# Patient Record
Sex: FEMALE | Race: BLACK OR AFRICAN AMERICAN | Employment: OTHER | URBAN - METROPOLITAN AREA
[De-identification: names, ages, dates, MRNs, and addresses within clinical notes are randomized per-mention and may not be internally consistent; named-entity substitution may affect disease eponyms.]

---

## 2023-03-02 PROBLEM — K21.9 GERD (GASTROESOPHAGEAL REFLUX DISEASE): Status: ACTIVE | Noted: 2023-03-02

## 2023-03-02 PROBLEM — M85.851 OSTEOPENIA OF BOTH HIPS: Status: ACTIVE | Noted: 2023-03-02

## 2023-03-02 PROBLEM — F41.8 DEPRESSION WITH ANXIETY: Status: ACTIVE | Noted: 2023-03-02

## 2023-03-02 PROBLEM — M85.852 OSTEOPENIA OF BOTH HIPS: Status: ACTIVE | Noted: 2023-03-02

## 2023-03-02 PROBLEM — N28.9 RENAL INSUFFICIENCY: Status: ACTIVE | Noted: 2023-03-02

## 2023-03-02 PROBLEM — I10 HYPERTENSION, BENIGN: Status: ACTIVE | Noted: 2023-03-02

## 2023-03-02 PROBLEM — F10.11 HISTORY OF ALCOHOL ABUSE: Status: ACTIVE | Noted: 2023-03-02

## 2023-03-02 PROBLEM — R82.81 PYURIA: Status: ACTIVE | Noted: 2023-03-02

## 2023-03-02 PROBLEM — Z86.19 HISTORY OF HEPATITIS C VIRUS INFECTION: Status: ACTIVE | Noted: 2023-03-02

## 2023-03-02 PROBLEM — D64.9 MILD ANEMIA: Status: ACTIVE | Noted: 2023-03-02

## 2023-03-02 PROBLEM — L65.9 ALOPECIA OF SCALP: Status: ACTIVE | Noted: 2023-03-02

## 2023-03-02 RX ORDER — ARIPIPRAZOLE 10 MG/1
1 TABLET ORAL DAILY
COMMUNITY
Start: 2022-08-24 | End: 2023-09-06 | Stop reason: SDUPTHER

## 2023-03-02 RX ORDER — LISINOPRIL 10 MG/1
1 TABLET ORAL DAILY
COMMUNITY
Start: 2022-08-24 | End: 2023-09-06 | Stop reason: SDUPTHER

## 2023-03-02 RX ORDER — HYDROCHLOROTHIAZIDE 25 MG/1
1 TABLET ORAL DAILY
COMMUNITY
Start: 2022-08-24 | End: 2023-03-20

## 2023-03-02 RX ORDER — IBANDRONATE SODIUM 150 MG/1
1 TABLET, FILM COATED ORAL
COMMUNITY
Start: 2022-09-21 | End: 2023-04-18

## 2023-03-02 RX ORDER — OMEPRAZOLE 40 MG/1
1 CAPSULE, DELAYED RELEASE ORAL DAILY
COMMUNITY
Start: 2022-08-24 | End: 2023-09-06 | Stop reason: SDUPTHER

## 2023-03-02 RX ORDER — HALOPERIDOL 5 MG/1
1 TABLET ORAL 2 TIMES DAILY
COMMUNITY
Start: 2022-08-24 | End: 2023-03-09 | Stop reason: SDUPTHER

## 2023-03-02 RX ORDER — AMLODIPINE BESYLATE 2.5 MG/1
1 TABLET ORAL DAILY
COMMUNITY
Start: 2022-08-24 | End: 2023-09-06 | Stop reason: SDUPTHER

## 2023-03-02 RX ORDER — DULOXETIN HYDROCHLORIDE 60 MG/1
1 CAPSULE, DELAYED RELEASE ORAL DAILY
COMMUNITY
Start: 2022-08-24 | End: 2023-09-06 | Stop reason: SDUPTHER

## 2023-03-09 ENCOUNTER — TELEPHONE (OUTPATIENT)
Dept: PRIMARY CARE | Facility: CLINIC | Age: 68
End: 2023-03-09
Payer: MEDICARE

## 2023-03-09 DIAGNOSIS — F41.8 DEPRESSION WITH ANXIETY: ICD-10-CM

## 2023-03-09 RX ORDER — HALOPERIDOL 5 MG/1
5 TABLET ORAL 2 TIMES DAILY
Qty: 180 TABLET | Refills: 2 | Status: SHIPPED | OUTPATIENT
Start: 2023-03-09 | End: 2023-09-06 | Stop reason: SDUPTHER

## 2023-03-20 DIAGNOSIS — I10 ESSENTIAL (PRIMARY) HYPERTENSION: Primary | ICD-10-CM

## 2023-03-20 RX ORDER — HYDROCHLOROTHIAZIDE 25 MG/1
TABLET ORAL
Qty: 30 TABLET | Refills: 5 | Status: SHIPPED | OUTPATIENT
Start: 2023-03-20 | End: 2023-09-06 | Stop reason: SDUPTHER

## 2023-04-18 DIAGNOSIS — M85.852 OTHER SPECIFIED DISORDERS OF BONE DENSITY AND STRUCTURE, LEFT THIGH: ICD-10-CM

## 2023-04-18 DIAGNOSIS — M85.851 OTHER SPECIFIED DISORDERS OF BONE DENSITY AND STRUCTURE, RIGHT THIGH: ICD-10-CM

## 2023-04-18 RX ORDER — IBANDRONATE SODIUM 150 MG/1
TABLET, FILM COATED ORAL
Qty: 1 TABLET | Refills: 5 | Status: SHIPPED | OUTPATIENT
Start: 2023-04-18 | End: 2023-10-02 | Stop reason: SDUPTHER

## 2023-04-18 NOTE — TELEPHONE ENCOUNTER
Rx Refill Request     Name: Shivani Neil  :  1955     Date of last appointment:  2022  Date of next appointment:  2023   Best number to reach patient:  [phone]

## 2023-09-06 ENCOUNTER — TELEPHONE (OUTPATIENT)
Dept: PRIMARY CARE | Facility: CLINIC | Age: 68
End: 2023-09-06
Payer: MEDICARE

## 2023-09-06 DIAGNOSIS — F41.8 DEPRESSION WITH ANXIETY: ICD-10-CM

## 2023-09-06 DIAGNOSIS — K21.9 GASTROESOPHAGEAL REFLUX DISEASE, UNSPECIFIED WHETHER ESOPHAGITIS PRESENT: ICD-10-CM

## 2023-09-06 DIAGNOSIS — I10 ESSENTIAL (PRIMARY) HYPERTENSION: ICD-10-CM

## 2023-09-06 DIAGNOSIS — I10 HYPERTENSION, BENIGN: ICD-10-CM

## 2023-09-06 RX ORDER — ARIPIPRAZOLE 10 MG/1
10 TABLET ORAL DAILY
Qty: 90 TABLET | Refills: 3 | Status: SHIPPED | OUTPATIENT
Start: 2023-09-06

## 2023-09-06 RX ORDER — OMEPRAZOLE 40 MG/1
40 CAPSULE, DELAYED RELEASE ORAL DAILY
Qty: 90 CAPSULE | Refills: 2 | Status: SHIPPED | OUTPATIENT
Start: 2023-09-06 | End: 2024-05-29

## 2023-09-06 RX ORDER — HYDROCHLOROTHIAZIDE 25 MG/1
25 TABLET ORAL DAILY
Qty: 90 TABLET | Refills: 3 | Status: SHIPPED | OUTPATIENT
Start: 2023-09-06

## 2023-09-06 RX ORDER — LISINOPRIL 10 MG/1
10 TABLET ORAL DAILY
Qty: 90 TABLET | Refills: 3 | Status: SHIPPED | OUTPATIENT
Start: 2023-09-06

## 2023-09-06 RX ORDER — AMLODIPINE BESYLATE 2.5 MG/1
2.5 TABLET ORAL DAILY
Qty: 90 TABLET | Refills: 3 | Status: SHIPPED | OUTPATIENT
Start: 2023-09-06 | End: 2023-12-12 | Stop reason: ALTCHOICE

## 2023-09-06 RX ORDER — HALOPERIDOL 5 MG/1
5 TABLET ORAL 2 TIMES DAILY
Qty: 180 TABLET | Refills: 2 | Status: SHIPPED | OUTPATIENT
Start: 2023-09-06 | End: 2023-11-13

## 2023-09-06 RX ORDER — DULOXETIN HYDROCHLORIDE 60 MG/1
60 CAPSULE, DELAYED RELEASE ORAL DAILY
Qty: 90 CAPSULE | Refills: 3 | Status: SHIPPED | OUTPATIENT
Start: 2023-09-06

## 2023-09-06 NOTE — TELEPHONE ENCOUNTER
Medication has been pended to provider for approval.     LV: Visit date not found   NV:  10/2/2023

## 2023-10-02 ENCOUNTER — OFFICE VISIT (OUTPATIENT)
Dept: PRIMARY CARE | Facility: CLINIC | Age: 68
End: 2023-10-02
Payer: MEDICARE

## 2023-10-02 VITALS
SYSTOLIC BLOOD PRESSURE: 114 MMHG | OXYGEN SATURATION: 99 % | WEIGHT: 151.6 LBS | BODY MASS INDEX: 29.76 KG/M2 | DIASTOLIC BLOOD PRESSURE: 66 MMHG | HEART RATE: 91 BPM | TEMPERATURE: 97.4 F | HEIGHT: 60 IN | RESPIRATION RATE: 16 BRPM

## 2023-10-02 DIAGNOSIS — Z23 NEED FOR INFLUENZA VACCINATION: ICD-10-CM

## 2023-10-02 DIAGNOSIS — F25.0 SCHIZOAFFECTIVE DISORDER, BIPOLAR TYPE (MULTI): ICD-10-CM

## 2023-10-02 DIAGNOSIS — Z12.11 SCREENING FOR MALIGNANT NEOPLASM OF COLON: ICD-10-CM

## 2023-10-02 DIAGNOSIS — Z00.00 ROUTINE GENERAL MEDICAL EXAMINATION AT HEALTH CARE FACILITY: Primary | ICD-10-CM

## 2023-10-02 DIAGNOSIS — K21.9 GASTROESOPHAGEAL REFLUX DISEASE WITHOUT ESOPHAGITIS: ICD-10-CM

## 2023-10-02 DIAGNOSIS — Z12.31 ENCOUNTER FOR SCREENING MAMMOGRAM FOR MALIGNANT NEOPLASM OF BREAST: ICD-10-CM

## 2023-10-02 DIAGNOSIS — I10 HYPERTENSION, BENIGN: ICD-10-CM

## 2023-10-02 DIAGNOSIS — D64.9 MILD ANEMIA: ICD-10-CM

## 2023-10-02 DIAGNOSIS — M85.852 OTHER SPECIFIED DISORDERS OF BONE DENSITY AND STRUCTURE, LEFT THIGH: ICD-10-CM

## 2023-10-02 DIAGNOSIS — M85.851 OTHER SPECIFIED DISORDERS OF BONE DENSITY AND STRUCTURE, RIGHT THIGH: ICD-10-CM

## 2023-10-02 DIAGNOSIS — Q61.3 POLYCYSTIC KIDNEY DISEASE: ICD-10-CM

## 2023-10-02 PROCEDURE — 1170F FXNL STATUS ASSESSED: CPT | Performed by: FAMILY MEDICINE

## 2023-10-02 PROCEDURE — 1159F MED LIST DOCD IN RCRD: CPT | Performed by: FAMILY MEDICINE

## 2023-10-02 PROCEDURE — G0439 PPPS, SUBSEQ VISIT: HCPCS | Performed by: FAMILY MEDICINE

## 2023-10-02 PROCEDURE — 99214 OFFICE O/P EST MOD 30 MIN: CPT | Performed by: FAMILY MEDICINE

## 2023-10-02 PROCEDURE — 90662 IIV NO PRSV INCREASED AG IM: CPT | Performed by: FAMILY MEDICINE

## 2023-10-02 PROCEDURE — 3078F DIAST BP <80 MM HG: CPT | Performed by: FAMILY MEDICINE

## 2023-10-02 PROCEDURE — G0008 ADMIN INFLUENZA VIRUS VAC: HCPCS | Performed by: FAMILY MEDICINE

## 2023-10-02 PROCEDURE — 1160F RVW MEDS BY RX/DR IN RCRD: CPT | Performed by: FAMILY MEDICINE

## 2023-10-02 PROCEDURE — 3074F SYST BP LT 130 MM HG: CPT | Performed by: FAMILY MEDICINE

## 2023-10-02 RX ORDER — IBANDRONATE SODIUM 150 MG/1
150 TABLET, FILM COATED ORAL
Qty: 1 TABLET | Refills: 5 | Status: SHIPPED | OUTPATIENT
Start: 2023-10-02 | End: 2024-03-30

## 2023-10-02 RX ORDER — DEUTETRABENAZINE 9 MG/1
9 TABLET, COATED ORAL 2 TIMES DAILY
COMMUNITY
Start: 2023-08-09

## 2023-10-02 ASSESSMENT — PATIENT HEALTH QUESTIONNAIRE - PHQ9
10. IF YOU CHECKED OFF ANY PROBLEMS, HOW DIFFICULT HAVE THESE PROBLEMS MADE IT FOR YOU TO DO YOUR WORK, TAKE CARE OF THINGS AT HOME, OR GET ALONG WITH OTHER PEOPLE: VERY DIFFICULT
5. POOR APPETITE OR OVEREATING: MORE THAN HALF THE DAYS
3. TROUBLE FALLING OR STAYING ASLEEP: NOT AT ALL
7. TROUBLE CONCENTRATING ON THINGS, SUCH AS READING THE NEWSPAPER OR WATCHING TELEVISION: NEARLY EVERY DAY
2. FEELING DOWN, DEPRESSED OR HOPELESS: NEARLY EVERY DAY
SUM OF ALL RESPONSES TO PHQ9 QUESTIONS 1 & 2: 6
6. FEELING BAD ABOUT YOURSELF - OR THAT YOU ARE A FAILURE OR HAVE LET YOURSELF OR YOUR FAMILY DOWN: NEARLY EVERY DAY
9. THOUGHTS THAT YOU WOULD BE BETTER OFF DEAD, OR OF HURTING YOURSELF: NOT AT ALL
4. FEELING TIRED OR HAVING LITTLE ENERGY: NOT AT ALL
1. LITTLE INTEREST OR PLEASURE IN DOING THINGS: NEARLY EVERY DAY
8. MOVING OR SPEAKING SO SLOWLY THAT OTHER PEOPLE COULD HAVE NOTICED. OR THE OPPOSITE, BEING SO FIGETY OR RESTLESS THAT YOU HAVE BEEN MOVING AROUND A LOT MORE THAN USUAL: NOT AT ALL
SUM OF ALL RESPONSES TO PHQ QUESTIONS 1-9: 14

## 2023-10-02 ASSESSMENT — ACTIVITIES OF DAILY LIVING (ADL)
DRESSING: INDEPENDENT
TAKING_MEDICATION: INDEPENDENT
DOING_HOUSEWORK: INDEPENDENT
MANAGING_FINANCES: INDEPENDENT
BATHING: INDEPENDENT
GROCERY_SHOPPING: INDEPENDENT

## 2023-10-02 ASSESSMENT — ENCOUNTER SYMPTOMS
DEPRESSION: 0
OCCASIONAL FEELINGS OF UNSTEADINESS: 0
LOSS OF SENSATION IN FEET: 0

## 2023-10-02 NOTE — PROGRESS NOTES
Chief Complaint   Patient presents with    Medicare Annual Wellness Visit Subsequent    Follow-up     Hypertension, osteopenia, anemia and other chronic medical conditions.       HPI: Shivani Neil is a 68 y.o. female presenting for follow-up Hypertension, osteopenia, anemia and other chronic medical conditions.     Patient requests a refill of the medication, Austedo 9 mg that she was placed on for her shaking hands. She states that Dr. Milligan placed her on this medication? She states that she has a phone call with him tomorrow and I advised patient that she may neef to contact him for that refill. The medication does seem to be helping her tremors.     Patient's sister had a kidney transplant 1 year ago.     ROS    Except positives as noted in the CC & HPI   Constitutional: Denies fevers, chills, night sweats, fatigue, weight changes, change in appetite   Eyes: Denies blurry vision, double vision   ENT: Denies otalgia, trouble hearing, tinnitus, vertigo, nasal congestion, rhinorrhea, sore throat   Neck: Denies swelling, masses   Cardiovascular: Denies chest pain, palpitations, edema, orthopnea, syncope   Respiratory: Denies dyspnea, cough, wheezing, postural nocturnal dyspnea   Gastrointestinal: Denies abdominal pain, nausea, vomiting, diarrhea, constipation, melena, hematochezia   Genitourinary: Denies dysuria, hematuria, frequency, urgency   Musculoskeletal: Denies back pain, neck pain, arthralgias, myalgias   Integumentary: Denies skin lesions, rashes, masses   Neurological: Denies dizziness, headaches, confusion, limb weakness, paresthesias, syncope, convulsions   Psychiatric: Denies depression, anxiety, homicidal ideations, suicidal ideations, sleep disturbances   Endocrine: Denies polyphagia, polydipsia, polyuria, weakness, hair thinning, heat intolerance, cold intolerance, weight changes   Heme/Lymph: Denies easy bruising, easy bleeding, swollen glands     Vitals:    10/02/23 1603 10/02/23 1629   BP:  "131/82 114/66   BP Location:  Right arm   Patient Position:  Sitting   BP Cuff Size:  Adult   Pulse: 91    Resp: 16    Temp: 36.3 °C (97.4 °F)    SpO2: 99%    Weight: 68.8 kg (151 lb 9.6 oz)    Height: 1.52 m (4' 11.85\")        PHYSICAL EXAM    GENERAL APPEARANCE: well-developed, well-nourished, 68 y.o. female in no acute distress.  SKIN: warm, pink and dry without rash or concerning lesions.  MENTAL STATUS: alert and oriented × 3. Normal mood and affect appropriate to mood.  EARS: TMs shiny and move well with insufflation. Ear canals are clear bilaterally.  NECK: supple without lymphadenopathy. Carotid pulses are normal without bruits. Thyroid - is normal in midline without nodules.  CHEST: lungs are clear to auscultation without rales, rhonchi or wheezes.  HEART: regular, rate and rhythm without murmurs, rubs or gallops.  ABDOMEN: soft, flat, nondistended. No masses, hepatomegaly or splenomegaly is noted.  EXTREMITIES: no cyanosis, clubbing or edema. Pedal pulses are 2+ normal at the dorsalis pedis and posterior tibial pulses bilaterally.  NEUROLOGICAL: cranial nerves II through XII are grossly intact. Motor strength 5/5 at all fours. DTRs are 2+ normal at all fours bilaterally and symmetrically.      Below is the patient's most recent value for Albumin, ALT, AST, BUN, Calcium, Chloride, Cholesterol, CO2, Creatinine, GFR, Glucose, HDL, Hematocrit, Hemoglobin, Hemoglobin A1C, LDL, Magnesium, Phosphorus, Platelets, Potassium, PSA, Sodium, Triglycerides, and WBC.   Lab Results   Component Value Date    ALBUMIN 3.9 09/14/2022    ALT 20 09/14/2022    AST 30 09/14/2022    BUN 28 (H) 09/14/2022    CALCIUM 9.9 09/14/2022     09/14/2022    CHOL 233 (H) 09/14/2022    CO2 28 09/14/2022    CREATININE 1.26 (H) 09/14/2022    HDL 69.0 09/14/2022    HCT 36.8 09/14/2022    HGB 11.9 (L) 09/14/2022     09/14/2022    K 4.3 09/14/2022     09/14/2022    TRIG 83 09/14/2022    WBC 6.3 09/14/2022         ASSESSMENT:  1. " Routine general medical examination at health care facility  1 Year Follow Up In [facility] - PCP - Wellness Exam      2. Polycystic kidney disease        3. Other specified disorders of bone density and structure, right thigh  ibandronate (Boniva) 150 mg tablet      4. Other specified disorders of bone density and structure, left thigh  ibandronate (Boniva) 150 mg tablet      5. Hypertension, benign  Comprehensive Metabolic Panel    Lipid Panel      6. Gastroesophageal reflux disease without esophagitis        7. Mild anemia  CBC and Auto Differential      8. Schizoaffective disorder, bipolar type (CMS/HCC)        9. Screening for malignant neoplasm of colon  Cologuard® colon cancer screening    Cologuard® colon cancer screening      10. Encounter for screening mammogram for malignant neoplasm of breast  BI mammo bilateral screening tomosynthesis      11. Need for influenza vaccination  Flu vaccine, quadrivalent, high-dose, preservative free, age 65y+ (FLUZONE)          PLAN:  Patient will continue current diet, exercise plan, and medications.     Orders for  CMP, CBC, and LIPID was placed to be done fasting soon. We will call the patient with the results.      Patient was ordered a Bilateral mammogram for screening.   Patient was ordered a Cologuard for screening.     Patient was given a refill of her Boniva. Rx sent to [facility].     Patient will consult he sister in regards to reason for transplant and relay thay  information to us.     Patient was given a flu vaccine in the office today.     She will think about getting the singles vaccine a the pharmacy.     I have instructed the patient to follow-up with me in 6 months or sooner if needed.             Subjective   Reason for Visit: Shivani Neil is an 68 y.o. female here for a Medicare Wellness visit.          Reviewed all medications by prescribing practitioner or clinical pharmacist (such as prescriptions, OTCs, herbal therapies and supplements) and  "documented in the medical record.    HPI    Patient Care Team:  Cooper Woodson MD as PCP - General  Cooper Woodson MD as PCP - [employer] PCP     Review of Systems    Objective   Vitals:  /66 (BP Location: Right arm, Patient Position: Sitting, BP Cuff Size: Adult)   Pulse 91   Temp 36.3 °C (97.4 °F)   Resp 16   Ht 1.52 m (4' 11.85\")   Wt 68.8 kg (151 lb 9.6 oz)   SpO2 99%   BMI 29.76 kg/m²       Physical Exam    Assessment/Plan   Problem List Items Addressed This Visit       GERD (gastroesophageal reflux disease)    Hypertension, benign    Relevant Orders    Comprehensive Metabolic Panel    Lipid Panel    Mild anemia    Relevant Orders    CBC and Auto Differential    Schizoaffective disorder, bipolar type (CMS/HCC)     Other Visit Diagnoses       Routine general medical examination at health care facility    -  Primary    Relevant Orders    1 Year Follow Up In [facility] - PCP - Wellness Exam    Polycystic kidney disease        Other specified disorders of bone density and structure, right thigh        Relevant Medications    ibandronate (Boniva) 150 mg tablet    Other specified disorders of bone density and structure, left thigh        Relevant Medications    ibandronate (Boniva) 150 mg tablet    Screening for malignant neoplasm of colon        Relevant Orders    Cologuard® colon cancer screening    Encounter for screening mammogram for malignant neoplasm of breast        Relevant Orders    BI mammo bilateral screening tomosynthesis    Need for influenza vaccination        Relevant Orders    Flu vaccine, quadrivalent, high-dose, preservative free, age 65y+ (FLUZONE) (Completed)               "

## 2023-10-04 ENCOUNTER — APPOINTMENT (OUTPATIENT)
Dept: RADIOLOGY | Facility: HOSPITAL | Age: 68
End: 2023-10-04
Payer: MEDICARE

## 2023-10-05 ENCOUNTER — TELEPHONE (OUTPATIENT)
Dept: PRIMARY CARE | Facility: CLINIC | Age: 68
End: 2023-10-05
Payer: MEDICARE

## 2023-10-05 NOTE — TELEPHONE ENCOUNTER
Patient received a cologuard kit in the mail and needed assistance with how to complete it. She is aware that she will have to have [employer] pick it up with the included postage sticker and just call them or do it online. If she has issues with contacting [employer] she can call back ans we can schedule the  with the tracking number via the cologuard website.

## 2023-10-05 NOTE — TELEPHONE ENCOUNTER
Patient called stating that she just received medication through the mail and she doesn't know what it is or what it is for   Patient would like a call back   Please advise

## 2023-10-24 ENCOUNTER — APPOINTMENT (OUTPATIENT)
Dept: PRIMARY CARE | Facility: CLINIC | Age: 68
End: 2023-10-24
Payer: MEDICARE

## 2023-10-24 ENCOUNTER — HOSPITAL ENCOUNTER (OUTPATIENT)
Dept: RADIOLOGY | Facility: HOSPITAL | Age: 68
Discharge: HOME | End: 2023-10-24
Payer: MEDICARE

## 2023-10-24 DIAGNOSIS — Z12.31 ENCOUNTER FOR SCREENING MAMMOGRAM FOR MALIGNANT NEOPLASM OF BREAST: ICD-10-CM

## 2023-10-24 PROCEDURE — 77067 SCR MAMMO BI INCL CAD: CPT | Mod: 50

## 2023-10-24 PROCEDURE — 77067 SCR MAMMO BI INCL CAD: CPT | Mod: BILATERAL PROCEDURE | Performed by: RADIOLOGY

## 2023-10-24 PROCEDURE — 77063 BREAST TOMOSYNTHESIS BI: CPT | Mod: BILATERAL PROCEDURE | Performed by: RADIOLOGY

## 2023-10-26 LAB — NONINV COLON CA DNA+OCC BLD SCRN STL QL: NEGATIVE

## 2023-11-01 ENCOUNTER — APPOINTMENT (OUTPATIENT)
Dept: PRIMARY CARE | Facility: CLINIC | Age: 68
End: 2023-11-01
Payer: MEDICARE

## 2023-11-13 ENCOUNTER — TELEPHONE (OUTPATIENT)
Dept: PRIMARY CARE | Facility: CLINIC | Age: 68
End: 2023-11-13
Payer: MEDICARE

## 2023-11-13 RX ORDER — HALOPERIDOL 10 MG/1
10 TABLET ORAL
COMMUNITY
Start: 2023-11-10

## 2023-11-13 RX ORDER — NAPROXEN 500 MG/1
500 TABLET ORAL 2 TIMES DAILY
COMMUNITY
Start: 2023-05-29

## 2023-11-13 RX ORDER — NALOXONE HYDROCHLORIDE 4 MG/.1ML
SPRAY NASAL
COMMUNITY
Start: 2023-05-29

## 2023-11-13 NOTE — TELEPHONE ENCOUNTER
Patient is requesting her medication hydroCHLOROthiazide (HYDRODiuril) 25 mg tablet to be increased. Shivani is having right swelling in her foot and can barley walk on her foot.

## 2023-11-13 NOTE — TELEPHONE ENCOUNTER
Medication:  hydroCHLOROthiazide (HYDRODiuril) 25 mg tablet   Last fill: 09.07.2023 90 day supply   LOV: 11.22.2023   NOV: 10.02.2023     Please advise

## 2023-11-22 ENCOUNTER — APPOINTMENT (OUTPATIENT)
Dept: PRIMARY CARE | Facility: CLINIC | Age: 68
End: 2023-11-22
Payer: MEDICARE

## 2023-12-12 ENCOUNTER — OFFICE VISIT (OUTPATIENT)
Dept: PRIMARY CARE | Facility: CLINIC | Age: 68
End: 2023-12-12
Payer: MEDICARE

## 2023-12-12 VITALS
TEMPERATURE: 96.8 F | HEART RATE: 79 BPM | DIASTOLIC BLOOD PRESSURE: 58 MMHG | SYSTOLIC BLOOD PRESSURE: 90 MMHG | RESPIRATION RATE: 16 BRPM | OXYGEN SATURATION: 99 % | HEIGHT: 60 IN | WEIGHT: 151.2 LBS | BODY MASS INDEX: 29.68 KG/M2

## 2023-12-12 DIAGNOSIS — E66.3 OVERWEIGHT WITH BODY MASS INDEX (BMI) OF 29 TO 29.9 IN ADULT: ICD-10-CM

## 2023-12-12 DIAGNOSIS — F25.0 SCHIZOAFFECTIVE DISORDER, BIPOLAR TYPE (MULTI): ICD-10-CM

## 2023-12-12 DIAGNOSIS — K21.9 GASTROESOPHAGEAL REFLUX DISEASE WITHOUT ESOPHAGITIS: ICD-10-CM

## 2023-12-12 DIAGNOSIS — I10 HYPERTENSION, BENIGN: ICD-10-CM

## 2023-12-12 DIAGNOSIS — F41.8 DEPRESSION WITH ANXIETY: ICD-10-CM

## 2023-12-12 DIAGNOSIS — N28.9 RENAL INSUFFICIENCY: ICD-10-CM

## 2023-12-12 DIAGNOSIS — R55 HYPOTENSIVE SYNCOPE: Primary | ICD-10-CM

## 2023-12-12 PROCEDURE — 1036F TOBACCO NON-USER: CPT | Performed by: FAMILY MEDICINE

## 2023-12-12 PROCEDURE — 1159F MED LIST DOCD IN RCRD: CPT | Performed by: FAMILY MEDICINE

## 2023-12-12 PROCEDURE — 3008F BODY MASS INDEX DOCD: CPT | Performed by: FAMILY MEDICINE

## 2023-12-12 PROCEDURE — 3074F SYST BP LT 130 MM HG: CPT | Performed by: FAMILY MEDICINE

## 2023-12-12 PROCEDURE — 1160F RVW MEDS BY RX/DR IN RCRD: CPT | Performed by: FAMILY MEDICINE

## 2023-12-12 PROCEDURE — 99214 OFFICE O/P EST MOD 30 MIN: CPT | Performed by: FAMILY MEDICINE

## 2023-12-12 PROCEDURE — 3078F DIAST BP <80 MM HG: CPT | Performed by: FAMILY MEDICINE

## 2023-12-12 ASSESSMENT — PATIENT HEALTH QUESTIONNAIRE - PHQ9
8. MOVING OR SPEAKING SO SLOWLY THAT OTHER PEOPLE COULD HAVE NOTICED. OR THE OPPOSITE, BEING SO FIGETY OR RESTLESS THAT YOU HAVE BEEN MOVING AROUND A LOT MORE THAN USUAL: SEVERAL DAYS
6. FEELING BAD ABOUT YOURSELF - OR THAT YOU ARE A FAILURE OR HAVE LET YOURSELF OR YOUR FAMILY DOWN: SEVERAL DAYS
SUM OF ALL RESPONSES TO PHQ QUESTIONS 1-9: 14
9. THOUGHTS THAT YOU WOULD BE BETTER OFF DEAD, OR OF HURTING YOURSELF: NOT AT ALL
1. LITTLE INTEREST OR PLEASURE IN DOING THINGS: NEARLY EVERY DAY
4. FEELING TIRED OR HAVING LITTLE ENERGY: MORE THAN HALF THE DAYS
7. TROUBLE CONCENTRATING ON THINGS, SUCH AS READING THE NEWSPAPER OR WATCHING TELEVISION: SEVERAL DAYS
3. TROUBLE FALLING OR STAYING ASLEEP: NEARLY EVERY DAY
10. IF YOU CHECKED OFF ANY PROBLEMS, HOW DIFFICULT HAVE THESE PROBLEMS MADE IT FOR YOU TO DO YOUR WORK, TAKE CARE OF THINGS AT HOME, OR GET ALONG WITH OTHER PEOPLE: SOMEWHAT DIFFICULT
2. FEELING DOWN, DEPRESSED OR HOPELESS: NEARLY EVERY DAY
SUM OF ALL RESPONSES TO PHQ9 QUESTIONS 1 & 2: 6
5. POOR APPETITE OR OVEREATING: NOT AT ALL

## 2023-12-12 ASSESSMENT — ENCOUNTER SYMPTOMS
DEPRESSION: 0
OCCASIONAL FEELINGS OF UNSTEADINESS: 0
LOSS OF SENSATION IN FEET: 0

## 2023-12-12 NOTE — ASSESSMENT & PLAN NOTE
Condition is stable. Patient is to continue current medications and regimen. Patient is to follow up with psych to monitor his/her condition at least once annually.

## 2023-12-12 NOTE — PROGRESS NOTES
Subjective   Patient ID: Shivani Neil is a 68 y.o. female who presents for Hypertension.  I last saw the patient on 10/2/2023.     HPI   Patient states that she has been blacking out. She states that the last time this happened she broke her arm in two places in May. She states that this has been happening for 6 months now. She does not remember what she was doing. Her granddaughter and daughter took her to the ER.     Patient states that her right foot is repeatedly swelling up.     She states that she has been walking and cutting back on calories and still cannot lose any weight. She is wondering if it could be her medication.      Review of Systems  Except positives as noted in the CC & HPI      Constitutional: Denies fevers, chills, night sweats, fatigue, weight changes, change in appetite    Eyes: Denies blurry vision, double vision    ENT: Denies otalgia, trouble hearing, tinnitus, vertigo, nasal congestion, rhinorrhea, sore throat    Neck: Denies swelling, masses    Cardiovascular: Denies chest pain, palpitations, edema, orthopnea, syncope    Respiratory: Denies dyspnea, cough, wheezing, postural nocturnal dyspnea    Gastrointestinal: Denies abdominal pain, nausea, vomiting, diarrhea, constipation, melena, hematochezia    Genitourinary: Denies dysuria, hematuria, frequency, urgency    Musculoskeletal: Denies back pain, neck pain, myalgias    Integumentary: Denies skin lesions, rashes, masses    Neurological: Denies dizziness, headaches, confusion, limb weakness, paresthesias, convulsions    Psychiatric: Denies depression, anxiety, homicidal ideations, suicidal ideations, sleep disturbances    Endocrine: Denies polyphagia, polydipsia, polyuria, weakness, hair thinning, heat intolerance, cold intolerance, weight changes    Heme/Lymph: Denies easy bruising, easy bleeding    Objective   BP 90/58 (BP Location: Right arm, Patient Position: Sitting)   Pulse 79   Temp 36 °C (96.8 °F)   Resp 16   Ht 1.523 m  "(4' 11.95\")   Wt 68.6 kg (151 lb 3.2 oz)   SpO2 99%   BMI 29.58 kg/m²     Physical Exam  90/58 on recheck of BP in the right arm.     Gen. Appearance - well-developed, well-nourished, 68 y.o., Black female in no acute distress.     Skin - warm, pink and dry without rash or concerning lesions.     Mental Status - alert and oriented times 3. Normal mood and affect appropriate to mood.     Neck - supple without lymphadenopathy. Carotid pulses are normal without bruits. Thyroid is normal in midline without nodules.    Chest - lungs are clear to auscultation without rales, rhonchi or wheezes.     Heart - regular, rate, and rhythm without murmurs, rubs or gallops.     Abdomen - soft, obese, protuberant, nontender, nondistended. No masses, hepatomegaly or splenomegaly is noted. No rebound, rigidity or guarding is noted. Bowel sounds are normoactive.     Extremities - no cyanosis, clubbing or edema. Pedal pulses are 2+ normal at the dorsalis pedis and posterior tibial pulses bilaterally. Right foot reveals some discoloration as compared to the left foot.     Neurological - cranial nerves II through XII are grossly intact. Motor strength 5/5 at all fours.     Assessment/Plan   1. Hypotensive syncope  Transthoracic Echo (TTE) Complete    Holter or Event Cardiac Monitor    TSH with reflex to Free T4 if abnormal      2. Hypertension, benign  TSH with reflex to Free T4 if abnormal    Follow Up In [facility] - PCP - Established      3. Gastroesophageal reflux disease without esophagitis  Follow Up In [facility] - PCP - Established      4. Renal insufficiency  Follow Up In [facility] - PCP - Established      5. Depression with anxiety        6. Schizoaffective disorder, bipolar type (CMS/[facility])        7. Overweight with body mass index (BMI) of 29 to 29.9 in adult        Patient to continue current medications (with any exceptions as noted) and diet. Follow-up in 6 month(s) otherwise as needed.  "     Patient is to return for fasting CBC, CMP, lipid panel, TSH labs at their convenience prior to her next appointment. Fasting is nothing to eat or drink except water or black coffee for 8-12 hours. Will call patient with results when available.     Patient is to STOP taking Amlodipine for the time being.     Ordered Holter monitor. Will call patient with results when available.     Ordered echocardiogram. Will call patient with results when available.     Encouraged patient to drink plenty of fluids, especially water.     Patient is to follow up with her psychiatrist as scheduled.         Scribe Attestation  By signing my name below, IAshely Scribe   attest that this documentation has been prepared under the direction and in the presence of Cooper Woodson MD.

## 2023-12-12 NOTE — PATIENT INSTRUCTIONS
Patient to continue current medications (with any exceptions as noted) and diet. Follow-up in 6 month(s) otherwise as needed.      Patient is to return for fasting CBC, CMP, lipid panel, TSH labs at their convenience prior to her next appointment. Fasting is nothing to eat or drink except water or black coffee for 8-12 hours. Will call patient with results when available.     Patient is to STOP taking Amlodipine for the time being.     Ordered Holter monitor. Will call patient with results when available.     Ordered echocardiogram. Will call patient with results when available.     Encouraged patient to drink plenty of fluids, especially water.     Patient is to follow up with her psychiatrist as scheduled.

## 2023-12-28 ENCOUNTER — LAB (OUTPATIENT)
Dept: LAB | Facility: LAB | Age: 68
End: 2023-12-28
Payer: MEDICARE

## 2023-12-28 ENCOUNTER — HOSPITAL ENCOUNTER (OUTPATIENT)
Dept: CARDIOLOGY | Facility: HOSPITAL | Age: 68
Discharge: HOME | End: 2023-12-28
Payer: MEDICARE

## 2023-12-28 DIAGNOSIS — R55 HYPOTENSIVE SYNCOPE: ICD-10-CM

## 2023-12-28 DIAGNOSIS — I95.9 HYPOTENSION, UNSPECIFIED: ICD-10-CM

## 2023-12-28 DIAGNOSIS — I10 HYPERTENSION, BENIGN: ICD-10-CM

## 2023-12-28 DIAGNOSIS — D64.9 MILD ANEMIA: ICD-10-CM

## 2023-12-28 LAB
ALBUMIN SERPL BCP-MCNC: 4 G/DL (ref 3.4–5)
ALP SERPL-CCNC: 87 U/L (ref 33–136)
ALT SERPL W P-5'-P-CCNC: 15 U/L (ref 7–45)
ANION GAP SERPL CALC-SCNC: 14 MMOL/L (ref 10–20)
AORTIC VALVE MEAN GRADIENT: 4
AORTIC VALVE PEAK VELOCITY: 1.11
AST SERPL W P-5'-P-CCNC: 23 U/L (ref 9–39)
AV PEAK GRADIENT: 4.9
AVA (PEAK VEL): 2.29
AVA (VTI): 2.06
BASOPHILS # BLD AUTO: 0.03 X10*3/UL (ref 0–0.1)
BASOPHILS NFR BLD AUTO: 0.7 %
BILIRUB SERPL-MCNC: 0.4 MG/DL (ref 0–1.2)
BUN SERPL-MCNC: 11 MG/DL (ref 6–23)
CALCIUM SERPL-MCNC: 9.3 MG/DL (ref 8.6–10.3)
CHLORIDE SERPL-SCNC: 96 MMOL/L (ref 98–107)
CHOLEST SERPL-MCNC: 198 MG/DL (ref 0–199)
CHOLESTEROL/HDL RATIO: 3
CO2 SERPL-SCNC: 28 MMOL/L (ref 21–32)
CREAT SERPL-MCNC: 1.28 MG/DL (ref 0.5–1.05)
EJECTION FRACTION APICAL 4 CHAMBER: 53.4
EJECTION FRACTION: 55
EOSINOPHIL # BLD AUTO: 0.12 X10*3/UL (ref 0–0.7)
EOSINOPHIL NFR BLD AUTO: 2.6 %
ERYTHROCYTE [DISTWIDTH] IN BLOOD BY AUTOMATED COUNT: 13.8 % (ref 11.5–14.5)
GFR SERPL CREATININE-BSD FRML MDRD: 46 ML/MIN/1.73M*2
GLUCOSE SERPL-MCNC: 88 MG/DL (ref 74–99)
HCT VFR BLD AUTO: 36.4 % (ref 36–46)
HDLC SERPL-MCNC: 65 MG/DL
HGB BLD-MCNC: 12.2 G/DL (ref 12–16)
IMM GRANULOCYTES # BLD AUTO: 0.01 X10*3/UL (ref 0–0.7)
IMM GRANULOCYTES NFR BLD AUTO: 0.2 % (ref 0–0.9)
LDLC SERPL CALC-MCNC: 107 MG/DL
LEFT ATRIUM VOLUME AREA LENGTH INDEX BSA: 13.9
LEFT VENTRICLE INTERNAL DIMENSION DIASTOLE: 3.52 (ref 3.5–6)
LEFT VENTRICULAR OUTFLOW TRACT DIAMETER: 2
LYMPHOCYTES # BLD AUTO: 1.89 X10*3/UL (ref 1.2–4.8)
LYMPHOCYTES NFR BLD AUTO: 41.7 %
MCH RBC QN AUTO: 29.5 PG (ref 26–34)
MCHC RBC AUTO-ENTMCNC: 33.5 G/DL (ref 32–36)
MCV RBC AUTO: 88 FL (ref 80–100)
MITRAL VALVE E/A RATIO: 0.6
MITRAL VALVE E/E' RATIO: 6.29
MONOCYTES # BLD AUTO: 0.43 X10*3/UL (ref 0.1–1)
MONOCYTES NFR BLD AUTO: 9.5 %
NEUTROPHILS # BLD AUTO: 2.05 X10*3/UL (ref 1.2–7.7)
NEUTROPHILS NFR BLD AUTO: 45.3 %
NON HDL CHOLESTEROL: 133 MG/DL (ref 0–149)
NRBC BLD-RTO: 0 /100 WBCS (ref 0–0)
PLATELET # BLD AUTO: 308 X10*3/UL (ref 150–450)
POTASSIUM SERPL-SCNC: 3.5 MMOL/L (ref 3.5–5.3)
PROT SERPL-MCNC: 8 G/DL (ref 6.4–8.2)
RBC # BLD AUTO: 4.13 X10*6/UL (ref 4–5.2)
RIGHT VENTRICLE FREE WALL PEAK S': 9.36
RIGHT VENTRICLE PEAK SYSTOLIC PRESSURE: 28.2
SODIUM SERPL-SCNC: 134 MMOL/L (ref 136–145)
TRICUSPID ANNULAR PLANE SYSTOLIC EXCURSION: 1.8
TRIGL SERPL-MCNC: 130 MG/DL (ref 0–149)
TSH SERPL-ACNC: 2 MIU/L (ref 0.44–3.98)
VLDL: 26 MG/DL (ref 0–40)
WBC # BLD AUTO: 4.5 X10*3/UL (ref 4.4–11.3)

## 2023-12-28 PROCEDURE — 85025 COMPLETE CBC W/AUTO DIFF WBC: CPT

## 2023-12-28 PROCEDURE — 93306 TTE W/DOPPLER COMPLETE: CPT

## 2023-12-28 PROCEDURE — 93306 TTE W/DOPPLER COMPLETE: CPT | Performed by: INTERNAL MEDICINE

## 2023-12-28 PROCEDURE — 80053 COMPREHEN METABOLIC PANEL: CPT

## 2023-12-28 PROCEDURE — 36415 COLL VENOUS BLD VENIPUNCTURE: CPT

## 2023-12-28 PROCEDURE — 84443 ASSAY THYROID STIM HORMONE: CPT

## 2023-12-28 PROCEDURE — 80061 LIPID PANEL: CPT

## 2024-01-08 ENCOUNTER — APPOINTMENT (OUTPATIENT)
Dept: CARDIOLOGY | Facility: HOSPITAL | Age: 69
End: 2024-01-08
Payer: MEDICARE

## 2024-01-10 ENCOUNTER — APPOINTMENT (OUTPATIENT)
Dept: CARDIOLOGY | Facility: CLINIC | Age: 69
End: 2024-01-10
Payer: MEDICARE

## 2024-01-22 ENCOUNTER — TELEPHONE (OUTPATIENT)
Dept: PRIMARY CARE | Facility: CLINIC | Age: 69
End: 2024-01-22
Payer: MEDICARE

## 2024-01-22 NOTE — TELEPHONE ENCOUNTER
Patient called in wondering if Dr. Woodson could give her a call to discuss her stool? She stated he had advised her to let him know if there were any changes with her bowels and stool. Patient has been having black stool for the past couple days  Please Advise   Pink

## 2024-01-23 NOTE — TELEPHONE ENCOUNTER
Patient called back and I advised her of Dr. Stewart advice. Patient stated ok and stated her stool has returned to normal

## 2024-01-30 ENCOUNTER — APPOINTMENT (OUTPATIENT)
Dept: CARDIOLOGY | Facility: CLINIC | Age: 69
End: 2024-01-30
Payer: MEDICARE

## 2024-05-29 DIAGNOSIS — K21.9 GASTROESOPHAGEAL REFLUX DISEASE, UNSPECIFIED WHETHER ESOPHAGITIS PRESENT: ICD-10-CM

## 2024-05-29 RX ORDER — OMEPRAZOLE 40 MG/1
40 CAPSULE, DELAYED RELEASE ORAL DAILY
Qty: 90 CAPSULE | Refills: 0 | Status: SHIPPED | OUTPATIENT
Start: 2024-05-29

## 2024-06-10 ENCOUNTER — APPOINTMENT (OUTPATIENT)
Dept: PRIMARY CARE | Facility: CLINIC | Age: 69
End: 2024-06-10
Payer: MEDICARE

## 2024-07-01 ENCOUNTER — APPOINTMENT (OUTPATIENT)
Dept: PRIMARY CARE | Facility: CLINIC | Age: 69
End: 2024-07-01
Payer: MEDICARE

## 2024-07-02 ENCOUNTER — TELEPHONE (OUTPATIENT)
Dept: PRIMARY CARE | Facility: CLINIC | Age: 69
End: 2024-07-02
Payer: MEDICARE

## 2024-08-06 ENCOUNTER — APPOINTMENT (OUTPATIENT)
Dept: PRIMARY CARE | Facility: CLINIC | Age: 69
End: 2024-08-06
Payer: MEDICARE

## 2024-08-07 ENCOUNTER — HOSPITAL ENCOUNTER (INPATIENT)
Facility: HOSPITAL | Age: 69
LOS: 2 days | Discharge: HOME | End: 2024-08-10
Attending: INTERNAL MEDICINE | Admitting: INTERNAL MEDICINE
Payer: MEDICARE

## 2024-08-07 ENCOUNTER — APPOINTMENT (OUTPATIENT)
Dept: CARDIOLOGY | Facility: HOSPITAL | Age: 69
End: 2024-08-07
Payer: MEDICARE

## 2024-08-07 DIAGNOSIS — N17.9 ACUTE RENAL FAILURE, UNSPECIFIED ACUTE RENAL FAILURE TYPE (CMS-HCC): Primary | ICD-10-CM

## 2024-08-07 DIAGNOSIS — F41.8 DEPRESSION WITH ANXIETY: ICD-10-CM

## 2024-08-07 DIAGNOSIS — R55 SYNCOPE AND COLLAPSE: ICD-10-CM

## 2024-08-07 LAB
BASOPHILS # BLD AUTO: 0.03 X10*3/UL (ref 0–0.1)
BASOPHILS NFR BLD AUTO: 0.6 %
EOSINOPHIL # BLD AUTO: 0.09 X10*3/UL (ref 0–0.7)
EOSINOPHIL NFR BLD AUTO: 1.8 %
ERYTHROCYTE [DISTWIDTH] IN BLOOD BY AUTOMATED COUNT: 12.7 % (ref 11.5–14.5)
HCT VFR BLD AUTO: 40.5 % (ref 36–46)
HGB BLD-MCNC: 13.7 G/DL (ref 12–16)
IMM GRANULOCYTES # BLD AUTO: 0.01 X10*3/UL (ref 0–0.7)
IMM GRANULOCYTES NFR BLD AUTO: 0.2 % (ref 0–0.9)
LYMPHOCYTES # BLD AUTO: 2.4 X10*3/UL (ref 1.2–4.8)
LYMPHOCYTES NFR BLD AUTO: 47.6 %
MCH RBC QN AUTO: 29.1 PG (ref 26–34)
MCHC RBC AUTO-ENTMCNC: 33.8 G/DL (ref 32–36)
MCV RBC AUTO: 86 FL (ref 80–100)
MONOCYTES # BLD AUTO: 0.42 X10*3/UL (ref 0.1–1)
MONOCYTES NFR BLD AUTO: 8.3 %
NEUTROPHILS # BLD AUTO: 2.09 X10*3/UL (ref 1.2–7.7)
NEUTROPHILS NFR BLD AUTO: 41.5 %
NRBC BLD-RTO: 0 /100 WBCS (ref 0–0)
PLATELET # BLD AUTO: 318 X10*3/UL (ref 150–450)
RBC # BLD AUTO: 4.7 X10*6/UL (ref 4–5.2)
WBC # BLD AUTO: 5 X10*3/UL (ref 4.4–11.3)

## 2024-08-07 PROCEDURE — 85025 COMPLETE CBC W/AUTO DIFF WBC: CPT | Performed by: INTERNAL MEDICINE

## 2024-08-07 PROCEDURE — 84484 ASSAY OF TROPONIN QUANT: CPT | Performed by: INTERNAL MEDICINE

## 2024-08-07 PROCEDURE — 93010 ELECTROCARDIOGRAM REPORT: CPT | Performed by: INTERNAL MEDICINE

## 2024-08-07 PROCEDURE — 2500000004 HC RX 250 GENERAL PHARMACY W/ HCPCS (ALT 636 FOR OP/ED): Performed by: INTERNAL MEDICINE

## 2024-08-07 PROCEDURE — 93005 ELECTROCARDIOGRAM TRACING: CPT

## 2024-08-07 PROCEDURE — 99291 CRITICAL CARE FIRST HOUR: CPT | Performed by: INTERNAL MEDICINE

## 2024-08-07 PROCEDURE — 80143 DRUG ASSAY ACETAMINOPHEN: CPT | Performed by: INTERNAL MEDICINE

## 2024-08-07 PROCEDURE — 82805 BLOOD GASES W/O2 SATURATION: CPT | Performed by: INTERNAL MEDICINE

## 2024-08-07 PROCEDURE — 83605 ASSAY OF LACTIC ACID: CPT | Performed by: INTERNAL MEDICINE

## 2024-08-07 PROCEDURE — 96375 TX/PRO/DX INJ NEW DRUG ADDON: CPT

## 2024-08-07 PROCEDURE — 83735 ASSAY OF MAGNESIUM: CPT | Performed by: INTERNAL MEDICINE

## 2024-08-07 PROCEDURE — 96374 THER/PROPH/DIAG INJ IV PUSH: CPT

## 2024-08-07 PROCEDURE — 83690 ASSAY OF LIPASE: CPT | Performed by: INTERNAL MEDICINE

## 2024-08-07 PROCEDURE — 87635 SARS-COV-2 COVID-19 AMP PRB: CPT | Performed by: INTERNAL MEDICINE

## 2024-08-07 PROCEDURE — 80053 COMPREHEN METABOLIC PANEL: CPT | Performed by: INTERNAL MEDICINE

## 2024-08-07 RX ORDER — FAMOTIDINE 10 MG/ML
20 INJECTION INTRAVENOUS ONCE
Status: COMPLETED | OUTPATIENT
Start: 2024-08-07 | End: 2024-08-07

## 2024-08-07 RX ORDER — ONDANSETRON HYDROCHLORIDE 2 MG/ML
4 INJECTION, SOLUTION INTRAVENOUS ONCE
Status: COMPLETED | OUTPATIENT
Start: 2024-08-07 | End: 2024-08-07

## 2024-08-07 ASSESSMENT — PAIN SCALES - GENERAL
PAINLEVEL_OUTOF10: 0 - NO PAIN
PAINLEVEL_OUTOF10: 0 - NO PAIN

## 2024-08-07 ASSESSMENT — LIFESTYLE VARIABLES
HAVE YOU EVER FELT YOU SHOULD CUT DOWN ON YOUR DRINKING: NO
HAVE PEOPLE ANNOYED YOU BY CRITICIZING YOUR DRINKING: NO
EVER FELT BAD OR GUILTY ABOUT YOUR DRINKING: NO
TOTAL SCORE: 0
EVER HAD A DRINK FIRST THING IN THE MORNING TO STEADY YOUR NERVES TO GET RID OF A HANGOVER: NO

## 2024-08-07 ASSESSMENT — PAIN - FUNCTIONAL ASSESSMENT: PAIN_FUNCTIONAL_ASSESSMENT: 0-10

## 2024-08-08 ENCOUNTER — HOSPITAL ENCOUNTER (INPATIENT)
Dept: CARDIOLOGY | Facility: HOSPITAL | Age: 69
Discharge: HOME | End: 2024-08-08
Payer: MEDICARE

## 2024-08-08 ENCOUNTER — APPOINTMENT (OUTPATIENT)
Dept: RADIOLOGY | Facility: HOSPITAL | Age: 69
End: 2024-08-08
Payer: MEDICARE

## 2024-08-08 ENCOUNTER — APPOINTMENT (OUTPATIENT)
Dept: CARDIOLOGY | Facility: HOSPITAL | Age: 69
End: 2024-08-08
Payer: MEDICARE

## 2024-08-08 VITALS
RESPIRATION RATE: 19 BRPM | DIASTOLIC BLOOD PRESSURE: 70 MMHG | HEART RATE: 75 BPM | SYSTOLIC BLOOD PRESSURE: 149 MMHG | TEMPERATURE: 97.7 F | OXYGEN SATURATION: 99 %

## 2024-08-08 PROBLEM — E87.6 HYPOKALEMIA: Status: ACTIVE | Noted: 2024-08-08

## 2024-08-08 PROBLEM — R55 SYNCOPE AND COLLAPSE: Status: ACTIVE | Noted: 2024-08-08

## 2024-08-08 PROBLEM — N17.9 ACUTE RENAL FAILURE, UNSPECIFIED ACUTE RENAL FAILURE TYPE (CMS-HCC): Status: ACTIVE | Noted: 2024-08-08

## 2024-08-08 PROBLEM — R29.6 REPEATED FALLS: Status: ACTIVE | Noted: 2024-08-08

## 2024-08-08 PROBLEM — R42 DIZZY SPELLS: Status: ACTIVE | Noted: 2024-08-08

## 2024-08-08 LAB
ALBUMIN SERPL BCP-MCNC: 4.4 G/DL (ref 3.4–5)
ALP SERPL-CCNC: 82 U/L (ref 33–136)
ALT SERPL W P-5'-P-CCNC: 10 U/L (ref 7–45)
AMPHETAMINES UR QL SCN: NORMAL
ANION GAP SERPL CALC-SCNC: 15 MMOL/L (ref 10–20)
ANION GAP SERPL CALC-SCNC: 16 MMOL/L (ref 10–20)
AORTIC VALVE MEAN GRADIENT: 4 MMHG
AORTIC VALVE PEAK VELOCITY: 1.3 M/S
APAP SERPL-MCNC: <10 UG/ML
APPEARANCE UR: CLEAR
AST SERPL W P-5'-P-CCNC: 18 U/L (ref 9–39)
ATRIAL RATE: 68 BPM
ATRIAL RATE: 78 BPM
AV PEAK GRADIENT: 6.8 MMHG
AVA (PEAK VEL): 1.76 CM2
AVA (VTI): 1.7 CM2
BARBITURATES UR QL SCN: NORMAL
BASE EXCESS BLDV CALC-SCNC: -0.6 MMOL/L (ref -2–3)
BENZODIAZ UR QL SCN: NORMAL
BILIRUB SERPL-MCNC: 0.4 MG/DL (ref 0–1.2)
BILIRUB UR STRIP.AUTO-MCNC: NEGATIVE MG/DL
BODY TEMPERATURE: ABNORMAL
BUN SERPL-MCNC: 49 MG/DL (ref 6–23)
BUN SERPL-MCNC: 54 MG/DL (ref 6–23)
BZE UR QL SCN: NORMAL
CALCIUM SERPL-MCNC: 9.1 MG/DL (ref 8.6–10.3)
CALCIUM SERPL-MCNC: 9.8 MG/DL (ref 8.6–10.3)
CANNABINOIDS UR QL SCN: NORMAL
CARDIAC TROPONIN I PNL SERPL HS: 10 NG/L (ref 0–13)
CARDIAC TROPONIN I PNL SERPL HS: 11 NG/L (ref 0–13)
CHLORIDE SERPL-SCNC: 92 MMOL/L (ref 98–107)
CHLORIDE SERPL-SCNC: 96 MMOL/L (ref 98–107)
CO2 SERPL-SCNC: 22 MMOL/L (ref 21–32)
CO2 SERPL-SCNC: 22 MMOL/L (ref 21–32)
COLOR UR: ABNORMAL
CREAT SERPL-MCNC: 3.15 MG/DL (ref 0.5–1.05)
CREAT SERPL-MCNC: 3.86 MG/DL (ref 0.5–1.05)
EGFRCR SERPLBLD CKD-EPI 2021: 12 ML/MIN/1.73M*2
EGFRCR SERPLBLD CKD-EPI 2021: 16 ML/MIN/1.73M*2
EJECTION FRACTION APICAL 4 CHAMBER: 65.8
EJECTION FRACTION: 63 %
ERYTHROCYTE [DISTWIDTH] IN BLOOD BY AUTOMATED COUNT: 12.6 % (ref 11.5–14.5)
ETHANOL SERPL-MCNC: <10 MG/DL
FENTANYL+NORFENTANYL UR QL SCN: NORMAL
GLUCOSE SERPL-MCNC: 84 MG/DL (ref 74–99)
GLUCOSE SERPL-MCNC: 94 MG/DL (ref 74–99)
GLUCOSE UR STRIP.AUTO-MCNC: NORMAL MG/DL
HCO3 BLDV-SCNC: 24.9 MMOL/L (ref 22–26)
HCT VFR BLD AUTO: 33.6 % (ref 36–46)
HGB BLD-MCNC: 11.6 G/DL (ref 12–16)
HOLD SPECIMEN: NORMAL
HYALINE CASTS #/AREA URNS AUTO: ABNORMAL /LPF
INHALED O2 CONCENTRATION: 21 %
KETONES UR STRIP.AUTO-MCNC: NEGATIVE MG/DL
LACTATE SERPL-SCNC: 1.5 MMOL/L (ref 0.4–2)
LEFT ATRIUM VOLUME AREA LENGTH INDEX BSA: 12 ML/M2
LEFT VENTRICLE INTERNAL DIMENSION DIASTOLE: 3.31 CM (ref 3.5–6)
LEFT VENTRICULAR OUTFLOW TRACT DIAMETER: 1.72 CM
LEUKOCYTE ESTERASE UR QL STRIP.AUTO: ABNORMAL
LIPASE SERPL-CCNC: 41 U/L (ref 9–82)
LV EJECTION FRACTION BIPLANE: 66 %
MAGNESIUM SERPL-MCNC: 2.15 MG/DL (ref 1.6–2.4)
MCH RBC QN AUTO: 29.4 PG (ref 26–34)
MCHC RBC AUTO-ENTMCNC: 34.5 G/DL (ref 32–36)
MCV RBC AUTO: 85 FL (ref 80–100)
METHADONE UR QL SCN: NORMAL
MITRAL VALVE E/A RATIO: 0.65
NITRITE UR QL STRIP.AUTO: NEGATIVE
NRBC BLD-RTO: 0 /100 WBCS (ref 0–0)
OPIATES UR QL SCN: NORMAL
OXYCODONE+OXYMORPHONE UR QL SCN: NORMAL
OXYHGB MFR BLDV: 45.5 % (ref 45–75)
P AXIS: 37 DEGREES
P AXIS: 61 DEGREES
P OFFSET: 166 MS
P OFFSET: 171 MS
P ONSET: 116 MS
P ONSET: 133 MS
PCO2 BLDV: 43 MM HG (ref 41–51)
PCP UR QL SCN: NORMAL
PH BLDV: 7.37 PH (ref 7.33–7.43)
PH UR STRIP.AUTO: 5 [PH]
PLATELET # BLD AUTO: 273 X10*3/UL (ref 150–450)
PO2 BLDV: 30 MM HG (ref 35–45)
POTASSIUM SERPL-SCNC: 3 MMOL/L (ref 3.5–5.3)
POTASSIUM SERPL-SCNC: 3.3 MMOL/L (ref 3.5–5.3)
PR INTERVAL: 168 MS
PR INTERVAL: 202 MS
PROT SERPL-MCNC: 8.6 G/DL (ref 6.4–8.2)
PROT UR STRIP.AUTO-MCNC: NEGATIVE MG/DL
Q ONSET: 217 MS
Q ONSET: 217 MS
QRS COUNT: 11 BEATS
QRS COUNT: 13 BEATS
QRS DURATION: 86 MS
QRS DURATION: 90 MS
QT INTERVAL: 422 MS
QT INTERVAL: 426 MS
QTC CALCULATION(BAZETT): 448 MS
QTC CALCULATION(BAZETT): 485 MS
QTC FREDERICIA: 440 MS
QTC FREDERICIA: 465 MS
R AXIS: 47 DEGREES
R AXIS: 48 DEGREES
RBC # BLD AUTO: 3.94 X10*6/UL (ref 4–5.2)
RBC # UR STRIP.AUTO: NEGATIVE /UL
RBC #/AREA URNS AUTO: ABNORMAL /HPF
RIGHT VENTRICLE FREE WALL PEAK S': 14.1 CM/S
RIGHT VENTRICLE PEAK SYSTOLIC PRESSURE: 15.1 MMHG
SALICYLATES SERPL-MCNC: <3 MG/DL
SAO2 % BLDV: 46 % (ref 45–75)
SARS-COV-2 RNA RESP QL NAA+PROBE: NOT DETECTED
SODIUM SERPL-SCNC: 127 MMOL/L (ref 136–145)
SODIUM SERPL-SCNC: 130 MMOL/L (ref 136–145)
SP GR UR STRIP.AUTO: 1.01
T AXIS: 13 DEGREES
T AXIS: 6 DEGREES
T OFFSET: 428 MS
T OFFSET: 430 MS
TRICUSPID ANNULAR PLANE SYSTOLIC EXCURSION: 1.5 CM
UROBILINOGEN UR STRIP.AUTO-MCNC: NORMAL MG/DL
VENTRICULAR RATE: 68 BPM
VENTRICULAR RATE: 78 BPM
WBC # BLD AUTO: 4.8 X10*3/UL (ref 4.4–11.3)
WBC #/AREA URNS AUTO: ABNORMAL /HPF

## 2024-08-08 PROCEDURE — 1200000002 HC GENERAL ROOM WITH TELEMETRY DAILY

## 2024-08-08 PROCEDURE — 81001 URINALYSIS AUTO W/SCOPE: CPT | Performed by: INTERNAL MEDICINE

## 2024-08-08 PROCEDURE — 99232 SBSQ HOSP IP/OBS MODERATE 35: CPT

## 2024-08-08 PROCEDURE — 70450 CT HEAD/BRAIN W/O DYE: CPT | Performed by: RADIOLOGY

## 2024-08-08 PROCEDURE — 93010 ELECTROCARDIOGRAM REPORT: CPT | Performed by: INTERNAL MEDICINE

## 2024-08-08 PROCEDURE — 72125 CT NECK SPINE W/O DYE: CPT | Performed by: RADIOLOGY

## 2024-08-08 PROCEDURE — 93306 TTE W/DOPPLER COMPLETE: CPT | Performed by: INTERNAL MEDICINE

## 2024-08-08 PROCEDURE — 80307 DRUG TEST PRSMV CHEM ANLYZR: CPT | Performed by: INTERNAL MEDICINE

## 2024-08-08 PROCEDURE — 97165 OT EVAL LOW COMPLEX 30 MIN: CPT | Mod: GO

## 2024-08-08 PROCEDURE — 96361 HYDRATE IV INFUSION ADD-ON: CPT

## 2024-08-08 PROCEDURE — 99223 1ST HOSP IP/OBS HIGH 75: CPT | Performed by: INTERNAL MEDICINE

## 2024-08-08 PROCEDURE — 70450 CT HEAD/BRAIN W/O DYE: CPT

## 2024-08-08 PROCEDURE — 2500000004 HC RX 250 GENERAL PHARMACY W/ HCPCS (ALT 636 FOR OP/ED): Performed by: HOSPITALIST

## 2024-08-08 PROCEDURE — 2500000004 HC RX 250 GENERAL PHARMACY W/ HCPCS (ALT 636 FOR OP/ED): Performed by: INTERNAL MEDICINE

## 2024-08-08 PROCEDURE — 2500000001 HC RX 250 WO HCPCS SELF ADMINISTERED DRUGS (ALT 637 FOR MEDICARE OP): Performed by: INTERNAL MEDICINE

## 2024-08-08 PROCEDURE — 36415 COLL VENOUS BLD VENIPUNCTURE: CPT | Performed by: INTERNAL MEDICINE

## 2024-08-08 PROCEDURE — 72125 CT NECK SPINE W/O DYE: CPT

## 2024-08-08 PROCEDURE — 74176 CT ABD & PELVIS W/O CONTRAST: CPT

## 2024-08-08 PROCEDURE — 93005 ELECTROCARDIOGRAM TRACING: CPT

## 2024-08-08 PROCEDURE — 2500000001 HC RX 250 WO HCPCS SELF ADMINISTERED DRUGS (ALT 637 FOR MEDICARE OP)

## 2024-08-08 PROCEDURE — 87086 URINE CULTURE/COLONY COUNT: CPT | Mod: ELYLAB | Performed by: INTERNAL MEDICINE

## 2024-08-08 PROCEDURE — 93306 TTE W/DOPPLER COMPLETE: CPT

## 2024-08-08 PROCEDURE — 80048 BASIC METABOLIC PNL TOTAL CA: CPT | Performed by: INTERNAL MEDICINE

## 2024-08-08 PROCEDURE — 84484 ASSAY OF TROPONIN QUANT: CPT | Performed by: INTERNAL MEDICINE

## 2024-08-08 PROCEDURE — 97161 PT EVAL LOW COMPLEX 20 MIN: CPT | Mod: GP | Performed by: PHYSICAL THERAPIST

## 2024-08-08 PROCEDURE — 85027 COMPLETE CBC AUTOMATED: CPT | Performed by: INTERNAL MEDICINE

## 2024-08-08 RX ORDER — LANOLIN ALCOHOL/MO/W.PET/CERES
100 CREAM (GRAM) TOPICAL DAILY
Status: DISCONTINUED | OUTPATIENT
Start: 2024-08-08 | End: 2024-08-10 | Stop reason: HOSPADM

## 2024-08-08 RX ORDER — PANTOPRAZOLE SODIUM 40 MG/1
40 TABLET, DELAYED RELEASE ORAL DAILY
Status: DISCONTINUED | OUTPATIENT
Start: 2024-08-08 | End: 2024-08-10 | Stop reason: HOSPADM

## 2024-08-08 RX ORDER — PANTOPRAZOLE SODIUM 40 MG/10ML
40 INJECTION, POWDER, LYOPHILIZED, FOR SOLUTION INTRAVENOUS DAILY
Status: DISCONTINUED | OUTPATIENT
Start: 2024-08-08 | End: 2024-08-10 | Stop reason: HOSPADM

## 2024-08-08 RX ORDER — MULTIVIT-MIN/IRON FUM/FOLIC AC 7.5 MG-4
1 TABLET ORAL DAILY
Status: DISCONTINUED | OUTPATIENT
Start: 2024-08-08 | End: 2024-08-10 | Stop reason: HOSPADM

## 2024-08-08 RX ORDER — ACETAMINOPHEN 160 MG/5ML
650 SOLUTION ORAL EVERY 4 HOURS PRN
Status: DISCONTINUED | OUTPATIENT
Start: 2024-08-08 | End: 2024-08-10 | Stop reason: HOSPADM

## 2024-08-08 RX ORDER — HYDROCHLOROTHIAZIDE 25 MG/1
25 TABLET ORAL DAILY
Status: DISCONTINUED | OUTPATIENT
Start: 2024-08-08 | End: 2024-08-10 | Stop reason: HOSPADM

## 2024-08-08 RX ORDER — ONDANSETRON 4 MG/1
4 TABLET, FILM COATED ORAL EVERY 8 HOURS PRN
Status: DISCONTINUED | OUTPATIENT
Start: 2024-08-08 | End: 2024-08-10 | Stop reason: HOSPADM

## 2024-08-08 RX ORDER — FOLIC ACID 1 MG/1
1 TABLET ORAL DAILY
Status: DISCONTINUED | OUTPATIENT
Start: 2024-08-08 | End: 2024-08-10 | Stop reason: HOSPADM

## 2024-08-08 RX ORDER — POTASSIUM CHLORIDE 1.5 G/1.58G
40 POWDER, FOR SOLUTION ORAL ONCE
Status: COMPLETED | OUTPATIENT
Start: 2024-08-08 | End: 2024-08-08

## 2024-08-08 RX ORDER — POTASSIUM CHLORIDE 14.9 MG/ML
20 INJECTION INTRAVENOUS 2 TIMES DAILY
Status: COMPLETED | OUTPATIENT
Start: 2024-08-08 | End: 2024-08-08

## 2024-08-08 RX ORDER — ACETAMINOPHEN 650 MG/1
650 SUPPOSITORY RECTAL EVERY 4 HOURS PRN
Status: DISCONTINUED | OUTPATIENT
Start: 2024-08-08 | End: 2024-08-10 | Stop reason: HOSPADM

## 2024-08-08 RX ORDER — ARIPIPRAZOLE 10 MG/1
20 TABLET, ORALLY DISINTEGRATING ORAL DAILY
Status: DISCONTINUED | OUTPATIENT
Start: 2024-08-08 | End: 2024-08-08

## 2024-08-08 RX ORDER — DULOXETIN HYDROCHLORIDE 30 MG/1
60 CAPSULE, DELAYED RELEASE ORAL DAILY
Status: DISCONTINUED | OUTPATIENT
Start: 2024-08-08 | End: 2024-08-10 | Stop reason: HOSPADM

## 2024-08-08 RX ORDER — ARIPIPRAZOLE 10 MG/1
10 TABLET ORAL DAILY
Status: DISCONTINUED | OUTPATIENT
Start: 2024-08-08 | End: 2024-08-10 | Stop reason: HOSPADM

## 2024-08-08 RX ORDER — ONDANSETRON HYDROCHLORIDE 2 MG/ML
4 INJECTION, SOLUTION INTRAVENOUS EVERY 8 HOURS PRN
Status: DISCONTINUED | OUTPATIENT
Start: 2024-08-08 | End: 2024-08-10 | Stop reason: HOSPADM

## 2024-08-08 RX ORDER — ACETAMINOPHEN 325 MG/1
650 TABLET ORAL EVERY 4 HOURS PRN
Status: DISCONTINUED | OUTPATIENT
Start: 2024-08-08 | End: 2024-08-10 | Stop reason: HOSPADM

## 2024-08-08 RX ORDER — SODIUM CHLORIDE, SODIUM LACTATE, POTASSIUM CHLORIDE, CALCIUM CHLORIDE 600; 310; 30; 20 MG/100ML; MG/100ML; MG/100ML; MG/100ML
100 INJECTION, SOLUTION INTRAVENOUS CONTINUOUS
Status: DISCONTINUED | OUTPATIENT
Start: 2024-08-08 | End: 2024-08-09

## 2024-08-08 RX ORDER — LISINOPRIL 10 MG/1
10 TABLET ORAL DAILY
Status: DISCONTINUED | OUTPATIENT
Start: 2024-08-08 | End: 2024-08-10 | Stop reason: HOSPADM

## 2024-08-08 RX ORDER — POLYETHYLENE GLYCOL 3350 17 G/17G
17 POWDER, FOR SOLUTION ORAL DAILY PRN
Status: DISCONTINUED | OUTPATIENT
Start: 2024-08-08 | End: 2024-08-10 | Stop reason: HOSPADM

## 2024-08-08 RX ORDER — TALC
3 POWDER (GRAM) TOPICAL NIGHTLY PRN
Status: DISCONTINUED | OUTPATIENT
Start: 2024-08-08 | End: 2024-08-10 | Stop reason: HOSPADM

## 2024-08-08 RX ORDER — ARIPIPRAZOLE 10 MG/1
20 TABLET ORAL 2 TIMES DAILY
Status: DISCONTINUED | OUTPATIENT
Start: 2024-08-08 | End: 2024-08-08

## 2024-08-08 SDOH — SOCIAL STABILITY: SOCIAL INSECURITY: DO YOU FEEL ANYONE HAS EXPLOITED OR TAKEN ADVANTAGE OF YOU FINANCIALLY OR OF YOUR PERSONAL PROPERTY?: NO

## 2024-08-08 SDOH — SOCIAL STABILITY: SOCIAL INSECURITY: HAVE YOU HAD THOUGHTS OF HARMING ANYONE ELSE?: NO

## 2024-08-08 SDOH — SOCIAL STABILITY: SOCIAL INSECURITY: DO YOU FEEL UNSAFE GOING BACK TO THE PLACE WHERE YOU ARE LIVING?: NO

## 2024-08-08 SDOH — SOCIAL STABILITY: SOCIAL INSECURITY: WERE YOU ABLE TO COMPLETE ALL THE BEHAVIORAL HEALTH SCREENINGS?: YES

## 2024-08-08 SDOH — SOCIAL STABILITY: SOCIAL INSECURITY: HAVE YOU HAD ANY THOUGHTS OF HARMING ANYONE ELSE?: NO

## 2024-08-08 SDOH — SOCIAL STABILITY: SOCIAL INSECURITY: HAS ANYONE EVER THREATENED TO HURT YOUR FAMILY OR YOUR PETS?: NO

## 2024-08-08 SDOH — SOCIAL STABILITY: SOCIAL INSECURITY: ARE THERE ANY APPARENT SIGNS OF INJURIES/BEHAVIORS THAT COULD BE RELATED TO ABUSE/NEGLECT?: NO

## 2024-08-08 SDOH — SOCIAL STABILITY: SOCIAL INSECURITY: ARE YOU OR HAVE YOU BEEN THREATENED OR ABUSED PHYSICALLY, EMOTIONALLY, OR SEXUALLY BY ANYONE?: NO

## 2024-08-08 SDOH — SOCIAL STABILITY: SOCIAL INSECURITY: ABUSE: ADULT

## 2024-08-08 SDOH — SOCIAL STABILITY: SOCIAL INSECURITY: DOES ANYONE TRY TO KEEP YOU FROM HAVING/CONTACTING OTHER FRIENDS OR DOING THINGS OUTSIDE YOUR HOME?: NO

## 2024-08-08 ASSESSMENT — COGNITIVE AND FUNCTIONAL STATUS - GENERAL
DRESSING REGULAR UPPER BODY CLOTHING: A LITTLE
WALKING IN HOSPITAL ROOM: A LITTLE
DAILY ACTIVITIY SCORE: 19
MOVING FROM LYING ON BACK TO SITTING ON SIDE OF FLAT BED WITH BEDRAILS: A LITTLE
DRESSING REGULAR LOWER BODY CLOTHING: A LITTLE
PATIENT BASELINE BEDBOUND: NO
TOILETING: A LITTLE
DRESSING REGULAR UPPER BODY CLOTHING: A LITTLE
CLIMB 3 TO 5 STEPS WITH RAILING: A LOT
DAILY ACTIVITIY SCORE: 20
STANDING UP FROM CHAIR USING ARMS: A LITTLE
PERSONAL GROOMING: A LITTLE
TURNING FROM BACK TO SIDE WHILE IN FLAT BAD: A LITTLE
MOBILITY SCORE: 17
CLIMB 3 TO 5 STEPS WITH RAILING: A LOT
MOVING TO AND FROM BED TO CHAIR: A LITTLE
STANDING UP FROM CHAIR USING ARMS: A LITTLE
TOILETING: A LITTLE
DRESSING REGULAR LOWER BODY CLOTHING: A LITTLE
HELP NEEDED FOR BATHING: A LITTLE
HELP NEEDED FOR BATHING: A LITTLE
MOBILITY SCORE: 19
MOVING TO AND FROM BED TO CHAIR: A LITTLE
WALKING IN HOSPITAL ROOM: A LITTLE

## 2024-08-08 ASSESSMENT — ACTIVITIES OF DAILY LIVING (ADL)
TOILETING: INDEPENDENT
ADEQUATE_TO_COMPLETE_ADL: YES
FEEDING YOURSELF: INDEPENDENT
ASSISTIVE_DEVICE: DENTURES UPPER;DENTURES LOWER;EYEGLASSES
JUDGMENT_ADEQUATE_SAFELY_COMPLETE_DAILY_ACTIVITIES: YES
LACK_OF_TRANSPORTATION: NO
BATHING: INDEPENDENT
GROOMING: INDEPENDENT
HEARING - RIGHT EAR: FUNCTIONAL
BATHING_ASSISTANCE: STAND BY
WALKS IN HOME: INDEPENDENT
DRESSING YOURSELF: INDEPENDENT
LACK_OF_TRANSPORTATION: NO
PATIENT'S MEMORY ADEQUATE TO SAFELY COMPLETE DAILY ACTIVITIES?: YES
HEARING - LEFT EAR: FUNCTIONAL

## 2024-08-08 ASSESSMENT — LIFESTYLE VARIABLES
AUDIT-C TOTAL SCORE: 0
HOW OFTEN DO YOU HAVE A DRINK CONTAINING ALCOHOL: NEVER
AUDIT-C TOTAL SCORE: 0
SKIP TO QUESTIONS 9-10: 1
HOW OFTEN DO YOU HAVE 6 OR MORE DRINKS ON ONE OCCASION: NEVER
HOW MANY STANDARD DRINKS CONTAINING ALCOHOL DO YOU HAVE ON A TYPICAL DAY: PATIENT DOES NOT DRINK

## 2024-08-08 ASSESSMENT — PAIN SCALES - GENERAL
PAINLEVEL_OUTOF10: 0 - NO PAIN

## 2024-08-08 ASSESSMENT — PAIN - FUNCTIONAL ASSESSMENT
PAIN_FUNCTIONAL_ASSESSMENT: 0-10

## 2024-08-08 ASSESSMENT — PATIENT HEALTH QUESTIONNAIRE - PHQ9
1. LITTLE INTEREST OR PLEASURE IN DOING THINGS: NEARLY EVERY DAY
SUM OF ALL RESPONSES TO PHQ9 QUESTIONS 1 & 2: 4
2. FEELING DOWN, DEPRESSED OR HOPELESS: SEVERAL DAYS

## 2024-08-08 NOTE — PROGRESS NOTES
Physical Therapy    Physical Therapy Evaluation    Patient Name: Shivani Neil  MRN: [id number]  Today's Date: 8/8/2024   Time Calculation  Start Time: 0929  Stop Time: 0943  Time Calculation (min): 14 min  1103/1103-A    Assessment/Plan   PT Assessment  PT Assessment Results: Decreased endurance, Impaired balance, Decreased mobility  Rehab Prognosis: Good  Barriers to Discharge: None  Evaluation/Treatment Tolerance: Patient tolerated treatment well  Medical Staff Made Aware: Yes  Strengths: Support of Caregivers, Living arrangement secure  Barriers to Participation: Comorbidities  End of Session Communication: Bedside nurse  Assessment Comment: Recommend continued therapy in acute care followed by continued therapy at a low intensity level following D/C.  End of Session Patient Position: Up in chair, Alarm on  IP OR SWING BED PT PLAN  Inpatient or Swing Bed: Inpatient  PT Plan  Treatment/Interventions: Bed mobility, Transfer training, Gait training, Balance training, Strengthening, Endurance training, Therapeutic exercise, Stair training  PT Plan: Ongoing PT  PT Frequency: 2 times per week  PT Discharge Recommendations: Low intensity level of continued care  PT Recommended Transfer Status: Contact guard  Physical Therapy eval completed per MD requisition. P.T. recommendations as outlined above. Recommend D/C from acute care when medically appropriate as deemed by medical staff.    Subjective     General Visit Information:  General  Reason for Referral: impaired mobility  Referred By: Dr. Peterson (PT/OT 8/8)  Past Medical History Relevant to Rehab: includes: HTN, depression, anxiety, Bipolar, Hep C, schizophrenia, alopecia, osteopenia, ETOH, falls  Family/Caregiver Present: No  Co-Treatment: OT  Co-Treatment Reason: Pt. seen with OT to maximize safety and function  Prior to Session Communication: Bedside nurse  Patient Position Received: Bed, 2 rail up, Alarm on  Preferred Learning Style: auditory, verbal  General  Comment: Pt. is a 67yo who [place] [facility] following multiple syncopal episodes with falls. Pt. has been unable to eat or drink. (+) emesis.    Chest/ABD/Pelvic CT (8/8) (-) acute findings    Head CT (8/8) (-) acute findings    C-spine CT (8/8) (-) acute findings    Hgb (8/8) 11.6    K (8/8) 3.0    Na (8/8) 130 trending up    Dx: syncope acute renal failure    Home Living:  Home Living  Home Living Comments: Pt. lives with grandtr in a 2 level house with no FABIOLA. Bed/bath on 2nd floor with tub shower without a seat or grab bars. 1/2 bath on 1st floor.    Prior Level of Function:  Prior Function Per Pt/Caregiver Report  Prior Function Comments: Pt. amb without AD PTA. Pt was I with ADLs and IADLs PTA. Pt. reported multiple falls in last 3 months. Pt. does not drive.    Precautions:  Precautions  Medical Precautions:  (Activity order: no restrictions)  Precautions Comment: Per EMR: High fall risk         Objective     Pain:  Pain Assessment  Pain Assessment: 0-10  0-10 (Numeric) Pain Score: 0 - No pain    Cognition:  Cognition  Overall Cognitive Status: Within Functional Limits  Orientation Level: Oriented X4    General Assessments:  General Observation  General Observation: Tele, IV   Activity Tolerance  Endurance: Decreased tolerance for upright activites                 Dynamic Sitting Balance  Dynamic Sitting-Comments: Good static and dynamic sitting balance  Dynamic Standing Balance  Dynamic Standing-Comments: Fair+ static and dynamic standing balance    Functional Assessments:     Bed Mobility  Bed Mobility: Yes  Bed Mobility 1  Bed Mobility 1: Supine to sitting  Level of Assistance 1:  (SBA)  Transfers  Transfer: Yes  Transfer 1  Technique 1: Sit to stand, Stand to sit  Transfer Device 1:  (No AD)  Transfer Level of Assistance 1: Contact guard  Trials/Comments 1: CGA for safety  Ambulation/Gait Training  Ambulation/Gait Training Performed: Yes  Ambulation/Gait Training 1  Surface 1: Level  tile  Device 1: No device  Assistance 1: Contact guard  Quality of Gait 1: Narrow base of support (slow, reciprocal gait pattern)  Comments/Distance (ft) 1: 35'; A for balance, safety  Stairs  Stairs: No       Extremity/Trunk Assessments:        RLE   RLE : Within Functional Limits  LLE   LLE : Within Functional Limits    Outcome Measures:     [facility] Basic Mobility  Turning from your back to your side while in a flat bed without using bedrails: A little  Moving from lying on your back to sitting on the side of a flat bed without using bedrails: A little  Moving to and from bed to chair (including a wheelchair): A little  Standing up from a chair using your arms (e.g. wheelchair or bedside chair): A little  To walk in hospital room: A little  Climbing 3-5 steps with railing: A lot  Basic Mobility - Total Score: 17                                        Goals:  Encounter Problems       Encounter Problems (Active)       PT Problem       Pt. will transfer supine/sit with MOD I (Progressing)       Start:  08/08/24    Expected End:  08/22/24            Pt. will transfer sit/stand with FWW with MOD I (Progressing)       Start:  08/08/24    Expected End:  08/22/24            Pt.will ambulate 50' with MOD I (Progressing)       Start:  08/08/24    Expected End:  08/22/24            Pt. will perform 2 x 15 B LE AROM exercises  (Not Progressing)       Start:  08/08/24    Expected End:  08/22/24                   Education Documentation  Mobility Training, taught by Mac Arana, PT at 8/8/2024 12:52 PM.  Learner: Patient  Readiness: Acceptance  Method: Explanation  Response: Verbalizes Understanding, Needs Reinforcement  Comment: Role of PT, transfers, amb, safety, PT POC

## 2024-08-08 NOTE — CARE PLAN
Problem: Pain - Adult  Goal: Verbalizes/displays adequate comfort level or baseline comfort level  Outcome: Progressing     Problem: Safety - Adult  Goal: Free from fall injury  Outcome: Progressing     Problem: Discharge Planning  Goal: Discharge to home or other facility with appropriate resources  Outcome: Progressing     Problem: Chronic Conditions and Co-morbidities  Goal: Patient's chronic conditions and co-morbidity symptoms are monitored and maintained or improved  Outcome: Progressing     Problem: Fall/Injury  Goal: Not fall by end of shift  Outcome: Progressing  Goal: Be free from injury by end of the shift  Outcome: Progressing  Goal: Verbalize understanding of personal risk factors for fall in the hospital  Outcome: Progressing  Goal: Verbalize understanding of risk factor reduction measures to prevent injury from fall in the home  Outcome: Progressing  Goal: Use assistive devices by end of the shift  Outcome: Progressing  Goal: Pace activities to prevent fatigue by end of the shift  Outcome: Progressing   The patient's goals for the shift include      The clinical goals for the shift include monitor vs, labs, I&O's; manage pain; promote safe ambulation with assistance; Cardiology consult

## 2024-08-08 NOTE — CONSULTS
"Nutrition Initial Assessment:   Nutrition Assessment         Patient is a 68 y.o. female presenting with acute renal failure      Nutrition History:  Energy Intake: Poor < 50 %  Food and Nutrient History: RD consutled for MST = 2. Per pt, has not been able to keep food or liquids, including water, down for at least 1 week. Reports vomiting immediately after PO. Reports nausea better since admission, received Zofran today. Has been trying to eat small portions at meals. Discussed small, frequent meals that are bland may help. Is agreeable to Gelatein Plus BID. Prior to feeling unwell, usually ate 2 meals per day, which were breakfast and dinner. Says usually eats good portions at meals.  Vitamin/Herbal Supplement Use: none  Food Allergies/Intolerances:  None  GI Symptoms: Nausea - improved with Zofran  Oral Problems: None       Anthropometrics:  Height: 149.9 cm (4' 11\")   Weight: 60.7 kg (133 lb 13.1 oz)   BMI (Calculated): 27.01             Weight History:     Wt Readings from Last 10 Encounters:   08/08/24 60.7 kg (133 lb 13.1 oz)   12/12/23 68.6 kg (151 lb 3.2 oz)   10/02/23 68.8 kg (151 lb 9.6 oz)   09/21/22 64.9 kg (143 lb 2 oz)   08/24/22 63.7 kg (140 lb 6 oz)         Weight Change %:  Weight History / % Weight Change: No significant wt loss noted though wt records limited. Pt reports wt has been stable, though then said may have lost \"a few pounds in the last week\".    Nutrition Focused Physical Exam Findings:    Subcutaneous Fat Loss:   Orbital Fat Pads: Well nourished (slightly bulging fat pads)  Buccal Fat Pads: Well nourished (full, rounded cheeks)  Triceps: Well nourished (ample fat tissue)  Ribs: Defer  Muscle Wasting:  Temporalis: Well nourished (well-defined muscle)  Pectoralis (Clavicular Region): Well nourished (clavicle not visible)  Deltoid/Trapezius: Well nourished (rounded appearance at arm, shoulder, neck)  Interosseous: Well nourished (muscle bulges)  Trapezius/Infraspinatus/Supraspinatus " (Scapular Region): Defer  Quadriceps: Defer  Gastrocnemius: Defer  Edema:  Edema: none  Physical Findings:  Skin: Negative (for pressure injuries)    Nutrition Significant Labs:  BMP Trend:   Results from last 7 days   Lab Units 08/08/24  0539 08/07/24  2340   GLUCOSE mg/dL 84 94   CALCIUM mg/dL 9.1 9.8   SODIUM mmol/L 130* 127*   POTASSIUM mmol/L 3.0* 3.3*   CO2 mmol/L 22 22   CHLORIDE mmol/L 96* 92*   BUN mg/dL 49* 54*   CREATININE mg/dL 3.15* 3.86*        Nutrition Specific Medications:  Reviewed    I/O:    ;      Dietary Orders (From admission, onward)       Start     Ordered    08/08/24 1106  Oral nutritional supplements  Until discontinued        Question Answer Comment   Deliver with Lunch    Deliver with Dinner    Select supplement: Gelatein Plus        08/08/24 1106    08/08/24 0315  Adult diet Regular  Diet effective now        Question:  Diet type  Answer:  Regular    08/08/24 0316                     Estimated Needs:   Total Energy Estimated Needs (kCal): 1515 kCal  Method for Estimating Needs: 25 kcal/kg  Total Protein Estimated Needs (g): 61 g  Method for Estimating Needs: 1 g/kg or as renal function allows  Total Fluid Estimated Needs (mL): 1820 mL  Method for Estimating Needs: 30 ml/kg or per MD        Nutrition Diagnosis   Malnutrition Diagnosis  Patient has Malnutrition Diagnosis: No    Nutrition Diagnosis  Patient has Nutrition Diagnosis: Yes  Diagnosis Status (1): New  Nutrition Diagnosis 1: Inadequate oral intake  Related to (1): acute illness  As Evidenced by (1): decreased appetite, vomiting following PO       Nutrition Interventions/Recommendations         Nutrition Prescription:  Individualized Nutrition Prescription Provided for : Regular diet. Gelatein Plus BID. Consider GI consult if N/V continues s/p PO intakes.        Nutrition Interventions:   Interventions: Meals and snacks, Medical food supplement  Goal: Consumes 3 meals per day  Medical Food Supplement: Commercial beverage  Goal:  Gelatein Plus BID (160 kcal and 20 g protein per serving)    Collaboration and Referral of Nutrition Care: Collaboration by nutrition professional with other providers  Goal: RN    Nutrition Education:      Discussed small, frequent bland meals to help with nausea. Reviewed oral nutritional supplements to try.    Nutrition Monitoring and Evaluation   Food/Nutrient Related History Monitoring  Monitoring and Evaluation Plan: Energy intake, Amount of food, Fluid intake  Energy Intake: Estimated energy intake  Criteria: Pt meets >75% of estimated energy needs  Fluid Intake: Estimated fluid intake  Criteria: Meets >75% of estimated fluid needs  Amount of Food: Estimated amout of food, Medical food intake  Criteria: Pt consumes >50% of meals and supplements    Body Composition/Growth/Weight History  Monitoring and Evaluation Plan: Weight  Weight: Measured weight  Criteria: Maintains stable weight    Biochemical Data, Medical Tests and Procedures  Monitoring and Evaluation Plan: Glucose/endocrine profile, Electrolyte/renal panel  Electrolyte and Renal Panel: Sodium, Potassium, Phosphorus  Criteria: Electrolytes WNL  Glucose/Endocrine Profile: Glucose, casual  Criteria: BG within desirable range    Nutrition Focused Physical Findings  Monitoring and Evaluation Plan: Digestive System  Digestive System: Nausea, Vomiting  Criteria: Improvement in GI symptoms         Time Spent (min): 45 minutes

## 2024-08-08 NOTE — PROGRESS NOTES
08/08/24 1523   Discharge Planning   Living Arrangements Family members  (granddaughter)   Support Systems Family members;Friends/neighbors   Type of Residence Private residence   Number of Stairs to Enter Residence 0   Number of Stairs Within Residence 10   Who is requesting discharge planning? Provider   Expected Discharge Disposition Home   Does the patient need discharge transport arranged? No     Met with pt who reports she lives with her granddaughter but her granddaughter works. Pt. amb without device PTA. Pt was I with ADLs and IADLs PTA. Pt. reported multiple falls in last 3 months.  States she passed out. Lives in a townhouse with with bathroom & bedroom upstairs, HR . Does not drive. Granddaughter drives. No HHC or aid services. Has been to alcohol rehab in past, no other rehab. [facility] PT 17 OT 19. Care transitions team to to follow for case progression & dc needs as identified.

## 2024-08-08 NOTE — ED PROVIDER NOTES
"HPI   Chief Complaint   Patient presents with    Syncope     Pt states \"I have been fainting a lot, I cannot walk I just fall down, I can't eat or drink\"  pt states this has been going on for a couple of months        Patient presenting for evaluation of nausea vomiting and multiple syncopal episodes.  Patient states over the last week she has not been able to eat or drink any food.  Patient states she immediately vomits.  Patient denies diarrhea.  Notes decreased bowel movements.  Patient denies any pain.  Patient denies chest pain or shortness of breath.  Patient notes she does have a slight headache.  Patient states she has had multiple episodes of passing out over the last week as well.  Patient states every time she stands up she feels as though she is going to pass out.  Patient denies hitting her head or neck or any injuries related to the falls.  Patient notes decreased urination but denies pain with urination.      History provided by:  Patient          Patient History   No past medical history on file.  Past Surgical History:   Procedure Laterality Date    OTHER SURGICAL HISTORY  09/21/2022    No history of surgery     Family History   Problem Relation Name Age of Onset    Dementia Mother      Other (CEREBRAL ACCIDENT) Father      Other (CEREBRAL VASCULAR ACCIDENT) Sister      Heart attack Sister       Social History     Tobacco Use    Smoking status: Never     Passive exposure: Never    Smokeless tobacco: Never   Substance Use Topics    Alcohol use: Never    Drug use: Never       Physical Exam   ED Triage Vitals   Temperature Heart Rate Respirations BP   08/07/24 2302 08/07/24 2302 08/07/24 2302 08/07/24 2303   36 °C (96.8 °F) 93 16 158/71      Pulse Ox Temp Source Heart Rate Source Patient Position   08/07/24 2302 08/07/24 2302 08/07/24 2315 --   99 % Temporal Monitor       BP Location FiO2 (%)     -- --             Physical Exam  Vitals and nursing note reviewed.   Constitutional:       Appearance: She " is ill-appearing.   HENT:      Head: Atraumatic.      Right Ear: External ear normal.      Left Ear: External ear normal.      Nose: Nose normal.      Mouth/Throat:      Mouth: Mucous membranes are dry.      Dentition: Abnormal dentition.      Comments: Edentulous  Eyes:      Extraocular Movements: Extraocular movements intact.      Pupils: Pupils are equal, round, and reactive to light.   Cardiovascular:      Rate and Rhythm: Normal rate and regular rhythm.      Pulses: Normal pulses.   Pulmonary:      Effort: Pulmonary effort is normal.      Breath sounds: Normal breath sounds.   Abdominal:      General: Bowel sounds are decreased.      Palpations: Abdomen is soft.      Tenderness: There is abdominal tenderness in the left lower quadrant. There is left CVA tenderness.   Musculoskeletal:         General: No tenderness. Normal range of motion.      Cervical back: Normal range of motion and neck supple. No rigidity or tenderness.   Skin:     General: Skin is warm and dry.      Capillary Refill: Capillary refill takes 2 to 3 seconds.   Neurological:      General: No focal deficit present.      Mental Status: She is alert and oriented to person, place, and time. Mental status is at baseline.   Psychiatric:         Mood and Affect: Mood normal.         Behavior: Behavior normal.           ED Course & MDM   ED Course as of 08/08/24 0245   Thu Aug 08, 2024   0236 Discussed with Dr. Peterson and he accepts the patient to his service.  [JA]      ED Course User Index  [JA] Jeff Lynn DO         Diagnoses as of 08/08/24 0245   Syncope and collapse   Acute renal failure, unspecified acute renal failure type (CMS-HCC)                 No data recorded                                 Medical Decision Making  Differential diagnosis: Dehydration, MI, PE, infection, other    Patient presenting for evaluation of vomiting and multiple episodes of loss of consciousness.  Patient indicates for the last week she has not been able to  hold down any food.  Patient states in the last several days she feels as though she is going to pass out every time she stands up.  Patient states she has passed out several times.  Unclear as to why the patient is vomiting and not able to eat.  Nausea improved in the ED.  Treating with IV fluids.  No focal neurodeficit on exam.  No infectious source found the ED.  Patient admitted for further evaluation.        Procedure  ECG 12 lead    Performed by: Jeff Lynn DO  Authorized by: Jeff Lynn DO    ECG interpreted by ED Physician in the absence of a cardiologist: yes    Comments:      8/7/2024 23: 17 sinus rhythm with PVCs, rate 78, normal axis, ST segments normal, T waves normal, nonspecific EKG.  EKG interpreted by myself.  Critical Care    Performed by: Jeff Lynn DO  Authorized by: Jeff Lynn DO    Critical care provider statement:     Critical care time (minutes):  35    Critical care time was exclusive of:  Separately billable procedures and treating other patients    Critical care was necessary to treat or prevent imminent or life-threatening deterioration of the following conditions:  Renal failure    Critical care was time spent personally by me on the following activities:  Ordering and performing treatments and interventions, ordering and review of laboratory studies, ordering and review of radiographic studies, pulse oximetry, re-evaluation of patient's condition, review of old charts, evaluation of patient's response to treatment and development of treatment plan with patient or surrogate       Jeff Lynn DO  08/08/24 0241

## 2024-08-08 NOTE — CONSULTS
"Inpatient consult to Cardiology  Consult performed by: Cedrick Delacruz DO  Consult ordered by: Zhang Peterson MD  Reason for consult: Syncopal episode      Cardiology Consult Note      Date:   8/8/2024  Patient name:  Shivani Neil  Date of admission:  8/7/2024 11:12 PM  MRN:   [id number]  YOB: 1955  Time of Consult:  10:03 AM    Consulting Cardiologist:     REE Diaz, CNP/ Dr Delacruz  Primary Cardiologist:   none     Referring Provider: Dr Gonzales      Admission Diagnosis:     Acute renal failure, unspecified acute renal failure type (CMS-HCC)      History of Present Illness:      Shivani Neil is a 68 y.o.  female patient who is being at the request of Dr. Gonzales for inpatient consultation of  syncope . She was admitted on 8/7/2024.  Previous [facility] and [facility] records have been reviewed in detail.      Patient presented to [facility] emergency department on 8/7/2024 with chief complaint of nausea.  She describes that her stomach has been upset lately and that she has been vomiting.  She also describes that she is having daily episodes where she \"just passes out\" when she is walking.  She denies any sensation of dizziness or lightheadedness, chest pain, palpitations, shortness of breath prior to these episodes.  There is no dyspnea on exertion or shortness of breath at rest, no orthopnea, no lower extremity edema.  She denies recent constipation or diarrhea.  She denies any history of neurological issues or loss of bladder or bowel function at time of syncopal episodes.  Denies tobacco or drug use.  States she quit drinking alcohol 10 years ago.  She lives with her granddaughter.  Feels that she is steady on her feet and does not use a cane or walker for ambulation assistance.  States that she has felt chills and sweaty prior to admission.  She organizes her medications independently. She denies any previous cardiac history of arrhythmia, heart " failure, coronary disease.  States she has a family history of her sister and father having had strokes.    EKG showed sinus rhythm with occasional PVC.  Corrected QT interval 465 ms.  Repeat EKG on 8/8 with sinus rhythm with ventricular rate 68 bpm with corrected QT interval 440 ms.  Telemetry reveals normal sinus rhythm with average heart rates in the 70s.  CBC was normal, lactate 1.5, creatinine 3.86, potassium 3.3, sodium 127, magnesium 2.15, lipase 41, troponin 11 with repeat 10.  Coronavirus PCR not detected.  Patient is admitted to medical service, received IV fluid bolus and is receiving IV fluids.  Cardiology consult was placed for evaluation of syncopal episodes.  Review of electronic medical records shows that patient's primary care provider is Dr. Woodson and at her last office visit with him in December 2023 she was noted to have syncopal episodes and Holter monitor was ordered, patient states that this has not been done.    Past medical history  Hypertension  Bipolar disorder  Anxiety/depression  Gastroesophageal reflux disease  Renal insufficiency    Previous cardiac testing  12/28/2023 transthoracic echocardiogram  CONCLUSIONS:   1. Left ventricular systolic function is normal with a 60-65% estimated ejection fraction.   2. Spectral Doppler shows an impaired relaxation pattern of left ventricular diastolic filling.   3. The mitral valve is moderately thickened.   4. Trace to small pericardial effusion.       Allergies:     No Known Allergies      Past Medical History:     As above    Past Surgical History:     Past Surgical History:   Procedure Laterality Date    OTHER SURGICAL HISTORY  09/21/2022    No history of surgery       Family History:     Family History   Problem Relation Name Age of Onset    Dementia Mother      Other (CEREBRAL ACCIDENT) Father      Other (CEREBRAL VASCULAR ACCIDENT) Sister      Heart attack Sister         Social History:     Social History     Tobacco Use    Smoking status:  "Never     Passive exposure: Never    Smokeless tobacco: Never   Substance Use Topics    Alcohol use: Never    Drug use: Never       CURRENT INPATIENT MEDICATIONS    ARIPiprazole, 10 mg, oral, Daily  folic acid, 1 mg, oral, Daily  multivitamin with minerals, 1 tablet, oral, Daily  pantoprazole, 40 mg, oral, Daily   Or  pantoprazole, 40 mg, intravenous, Daily  potassium chloride, 20 mEq, intravenous, BID  thiamine, 100 mg, oral, Daily      lactated Ringer's, 100 mL/hr, Last Rate: 100 mL/hr (08/08/24 0414)      Current Outpatient Medications   Medication Instructions    ARIPiprazole (ABILIFY) 10 mg, oral, Daily    Austedo 9 mg, oral, 2 times daily    DULoxetine (CYMBALTA) 60 mg, oral, Daily    haloperidol (HALDOL) 10 mg    hydroCHLOROthiazide (HYDRODIURIL) 25 mg, oral, Daily    ibandronate (BONIVA) 150 mg, oral, Every 30 days, take with 8 to 10 ounces of water. Stay upright and do not eat or drink for 1 hour    lisinopril 10 mg, oral, Daily    naloxone (Narcan) 4 mg/0.1 mL nasal spray USE 1 SPRAY ONCE AS NEEDED FOR OPIOD OVERDOSE    naproxen (NAPROSYN) 500 mg, oral, 2 times daily    omeprazole (PRILOSEC) 40 mg, oral, Daily        Review of Systems:      12 point review of systems was obtained in detail and is negative other than that detailed above.    Vital Signs:     Vitals:    08/08/24 0045 08/08/24 0233 08/08/24 0345 08/08/24 0413   BP: 117/83 (!) 128/97 130/81    BP Location:  Right arm Left arm    Patient Position:  Lying Lying    Pulse: 76 71 80    Resp: 16 18 18    Temp:   35.3 °C (95.5 °F)    TempSrc:   Temporal    SpO2: 99% 99% 100%    Weight:    60.7 kg (133 lb 13.1 oz)   Height:    1.499 m (4' 11\")     No intake or output data in the 24 hours ending 08/08/24 1003    Wt Readings from Last 4 Encounters:   08/08/24 60.7 kg (133 lb 13.1 oz)   12/12/23 68.6 kg (151 lb 3.2 oz)   10/02/23 68.8 kg (151 lb 9.6 oz)   09/21/22 64.9 kg (143 lb 2 oz)       Physical Examination:     GENERAL APPEARANCE: Well developed, " well nourished, in no acute distress.  CHEST: Symmetric and non-tender.  INTEGUMENT: Skin warm and dry, without gross excoriationis or lesions.  HEENT: No gross abnormalities of conjunctiva, teeth, gums, oral mucosa  NECK: Supple, no JVD, no bruit. Thyroid not palpable. Carotid upstrokes normal.  NEURO/PSHCY: Alert and oriented x3; appropriate behavior and responses. Moves all extremities equally.  LUNGS: Clear to auscultation bilaterally; normal respiratory effort.  HEART: Rate and rhythm regular with no evident murmur; no gallop appreciated. There are no rubs, clicks or heaves. PMI nondisplaced.  ABDOMEN: Soft, nontender, no palpable hepatosplenomegaly, no mases, no bruits.   MUSCULOSKELETAL: Normal range of motion.  EXTREMITIES: Warm with good color, no clubbing or cyanois. There is no edema noted.  PERIPHERAL VASCULAR: Pulses present and equally palpable; 2+ throughout.     Lab:     CBC:   Results from last 7 days   Lab Units 08/08/24  0539 08/07/24  2340   WBC AUTO x10*3/uL 4.8 5.0   RBC AUTO x10*6/uL 3.94* 4.70   HEMOGLOBIN g/dL 11.6* 13.7   HEMATOCRIT % 33.6* 40.5   MCV fL 85 86   MCH pg 29.4 29.1   MCHC g/dL 34.5 33.8   RDW % 12.6 12.7   PLATELETS AUTO x10*3/uL 273 318     CMP:    Results from last 7 days   Lab Units 08/08/24  0539 08/07/24  2340   SODIUM mmol/L 130* 127*   POTASSIUM mmol/L 3.0* 3.3*   CHLORIDE mmol/L 96* 92*   CO2 mmol/L 22 22   BUN mg/dL 49* 54*   CREATININE mg/dL 3.15* 3.86*   GLUCOSE mg/dL 84 94   PROTEIN TOTAL g/dL  --  8.6*   CALCIUM mg/dL 9.1 9.8   BILIRUBIN TOTAL mg/dL  --  0.4   ALK PHOS U/L  --  82   AST U/L  --  18   ALT U/L  --  10     BMP:    Results from last 7 days   Lab Units 08/08/24  0539 08/07/24  2340   SODIUM mmol/L 130* 127*   POTASSIUM mmol/L 3.0* 3.3*   CHLORIDE mmol/L 96* 92*   CO2 mmol/L 22 22   BUN mg/dL 49* 54*   CREATININE mg/dL 3.15* 3.86*   CALCIUM mg/dL 9.1 9.8   GLUCOSE mg/dL 84 94     Magnesium:  Results from last 7 days   Lab Units 08/07/24  5534    MAGNESIUM mg/dL 2.15     Troponin:    Results from last 7 days   Lab Units 08/08/24  0040 08/07/24  2340   TROPHS ng/L 10 11     BNP:     Lipid Panel:         Diagnostic Studies:   @No results found for this or any previous visit.    ECG 12 lead    Result Date: 8/8/2024  Sinus rhythm with occasional Premature ventricular complexes and Premature atrial complexes Otherwise normal ECG No previous ECGs available See ED provider note for full interpretation and clinical correlation    CT chest abdomen pelvis wo IV contrast    Result Date: 8/8/2024  Interpreted By:  Jaswant Jimenes, STUDY: CT CHEST ABDOMEN PELVIS WO CONTRAST;  8/8/2024 12:35 am   INDICATION: Signs/Symptoms:syncope, flank pain.   COMPARISON: None.   ACCESSION NUMBER(S): [id number]   ORDERING CLINICIAN: MARIBEL HERNANDEZ   TECHNIQUE: Contiguous axial images of the chest, abdomen, and pelvis were obtained after the intravenous administration of iodinated contrast. Coronal and sagittal reformatted images were reconstructed from the axial data. CT chest was obtained using PE angiographic protocol with MIP images created on a separate independent workstation.   FINDINGS: CT CHEST:   There is some streak artifact from the arms which were not raised.   Heart size within normal limits.   No effusion.   No significant adenopathy suspected.   Lungs are clear without consolidation.   CT ABDOMEN/PELVIS:   Scattered cysts are seen involving the liver and kidneys.   Noncontrast appearance of the liver, gallbladder, adrenals, pancreas and spleen are otherwise unremarkable.   No hydronephrosis or hydroureter.   No bowel obstruction. No appendicitis or colitis.   Calcified fibroid uterus seen.   No free fluid or significant adenopathy.   Mild calcification aorta.   No free fluid or significant adenopathy.   Mild-to-moderate spondylotic degeneration seen worst at L5-S1.       No significant acute process seen chest abdomen pelvis on noncontrast imaging.   Incidental findings  as above   MACRO: None.   Signed by: Jaswant Jimenes 8/8/2024 1:39 AM Dictation workstation:   [id number]    CT head wo IV contrast    Result Date: 8/8/2024  Interpreted By:  Jaswant Jimenes, STUDY: CT HEAD WO IV CONTRAST; CT CERVICAL SPINE WO IV CONTRAST;  8/8/2024 12:35 am   INDICATION: Signs/Symptoms:syncope/fall.   COMPARISON: None.   ACCESSION NUMBER(S): [id number]; [id number]   ORDERING CLINICIAN: MARIBEL HERNANDEZ   TECHNIQUE: Noncontrast CT images of head. Axial noncontrast CT images of the cervical spine with coronal and sagittal reconstructed images.   FINDINGS: BRAIN PARENCHYMA:  Gray-white matter interfaces are  preserved. No mass effect or midline shift.   HEMORRHAGE: No acute intracranial hemorrhage. VENTRICLES and EXTRA-AXIAL SPACES: The ventricles and sulci are within normal limits in size for brain volume. No abnormal extraaxial fluid collection. EXTRACRANIAL SOFT TISSUES: Within normal limits. PARANASAL SINUSES/MASTOIDS:  The visualized paranasal sinuses and mastoid air cells are aerated. CALVARIUM: No depressed skull fracture. No destructive osseous lesion.   OTHER FINDINGS: None.   CERVICAL SPINE:   ALIGNMENT: Normal. VERTEBRAE: No acute fracture. SPINAL CANAL: Multilevel moderate degenerative disc disease seen. No critical spinal canal stenosis. PREVERTEBRAL SOFT TISSUES: No prevertebral soft tissue swelling. LUNG APICES: Imaged portion of the lung apices are within normal limits.   OTHER FINDINGS: None.       No acute intracranial abnormality.   No acute fracture or traumatic subluxation of the cervical spine.   Moderate cervical spondylotic degenerative change.   MACRO: None   Signed by: Jaswant Jimenes 8/8/2024 1:21 AM Dictation workstation:   [id number]    CT cervical spine wo IV contrast    Result Date: 8/8/2024  Interpreted By:  Jaswant Jimenes, STUDY: CT HEAD WO IV CONTRAST; CT CERVICAL SPINE WO IV CONTRAST;  8/8/2024 12:35 am   INDICATION: Signs/Symptoms:syncope/fall.   COMPARISON: None.    ACCESSION NUMBER(S): [id number]; [id number]   ORDERING CLINICIAN: MARIBEL HERNANDEZ   TECHNIQUE: Noncontrast CT images of head. Axial noncontrast CT images of the cervical spine with coronal and sagittal reconstructed images.   FINDINGS: BRAIN PARENCHYMA:  Gray-white matter interfaces are  preserved. No mass effect or midline shift.   HEMORRHAGE: No acute intracranial hemorrhage. VENTRICLES and EXTRA-AXIAL SPACES: The ventricles and sulci are within normal limits in size for brain volume. No abnormal extraaxial fluid collection. EXTRACRANIAL SOFT TISSUES: Within normal limits. PARANASAL SINUSES/MASTOIDS:  The visualized paranasal sinuses and mastoid air cells are aerated. CALVARIUM: No depressed skull fracture. No destructive osseous lesion.   OTHER FINDINGS: None.   CERVICAL SPINE:   ALIGNMENT: Normal. VERTEBRAE: No acute fracture. SPINAL CANAL: Multilevel moderate degenerative disc disease seen. No critical spinal canal stenosis. PREVERTEBRAL SOFT TISSUES: No prevertebral soft tissue swelling. LUNG APICES: Imaged portion of the lung apices are within normal limits.   OTHER FINDINGS: None.       No acute intracranial abnormality.   No acute fracture or traumatic subluxation of the cervical spine.   Moderate cervical spondylotic degenerative change.   MACRO: None   Signed by: Jaswant Jimenes 8/8/2024 1:21 AM Dictation workstation:   [id number]      No echocardiogram results found for the past 14 days    Radiology:     CT chest abdomen pelvis wo IV contrast   Final Result   No significant acute process seen chest abdomen pelvis on noncontrast   imaging.        Incidental findings as above        MACRO:   None.        Signed by: Jaswant Jimenes 8/8/2024 1:39 AM   Dictation workstation:   [id number]      CT head wo IV contrast   Final Result   No acute intracranial abnormality.        No acute fracture or traumatic subluxation of the cervical spine.        Moderate cervical spondylotic degenerative change.        MACRO:    None        Signed by: Jaswant Jimenes 8/8/2024 1:21 AM   Dictation workstation:   [id number]      CT cervical spine wo IV contrast   Final Result   No acute intracranial abnormality.        No acute fracture or traumatic subluxation of the cervical spine.        Moderate cervical spondylotic degenerative change.        MACRO:   None        Signed by: Jaswant Jimenes 8/8/2024 1:21 AM   Dictation workstation:   [id number]      Transthoracic Echo (TTE) Complete    (Results Pending)   Holter Or Event Cardiac Monitor    (Results Pending)       Problem List:     Patient Active Problem List   Diagnosis    Alopecia of scalp    Depression with anxiety    GERD (gastroesophageal reflux disease)    History of alcohol abuse    History of hepatitis C virus infection    Hypertension, benign    Mild anemia    Osteopenia of both hips    Pyuria    Renal insufficiency    Schizoaffective disorder, bipolar type (Multi)    Overweight with body mass index (BMI) of 29 to 29.9 in adult    Acute renal failure, unspecified acute renal failure type (CMS-HCC)    Repeated falls    Syncope and collapse    Dizzy spells    Hypokalemia       Assessment:   68-year-old female seen evaluate today while hospitalized for acute renal failure at the request of the primary care service in conjunction with Geri Deleon RN, CNP.    Bedside examination evaluation performed.    Chart was reviewed detail discussed the patient at length.    Impression:    Report syncopal episodes  Acute kidney injury on chronic kidney disease, improving with IV fluids being managed by primary service  Hypokalemia  Hyponatremia  Hypertension  Schizoaffective disorder, bipolar type with reported taking 20 mg twice daily of Abilify but per pharmacy dose is 10 mg daily      Plan:   Recommendation:    Continue telemetry  Obtain transthoracic echocardiogram  Obtain orthostatic vital signs  Agree with holding hydrochlorothiazide  Replace potassium to keep potassium greater than  4  48-hour Holter monitor and cardiology follow-up to be arranged  Need to ensure patient is taking her medications correctly      Discussion:  This is a patient whose hospitalization is due to acute renal failure.  Apparently over the last year or so there have been some reports of her having passed out at home.  She has no specific cardiovascular history.  Does have a psychiatric history and has been some question about proper use of her medications.  This has not been a ongoing complaint that the patient has voiced pertaining to the syncope.  She has never had chest pain.  She is never had cardiovascular disease identified.  Based on the above and the fact that there are no obvious cardiac issues at this time we will obtain an echo while here.  Postural blood pressures can be checked.  Thus far EKGs have been normal.  No gross cardiac lab abnormalities have been seen.  Will arrange for outpatient monitoring and then follow-up thereafter.  Her kidney failure will be addressed by the primary care service along with nephrology.    Cedrick Delacruz DO,FACC     Geri Deleon, DEMETRIUS  [facility]      Of note, this documentation is completed using the Dragon Dictation system (voice recognition software). There may be spelling and/or grammatical errors that were not corrected prior to final submission.      Electronically signed by Cedrick Delacruz DO, on 8/8/2024 at 10:03 AM

## 2024-08-08 NOTE — H&P
History Of Present Illness  Shivani Neil is a 68 y.o. female presenting with repeated falls.  Patient also reported dizzy spells associated with falls over the last 2 months.  Her daughter was concerned that she was not eating or drinking well over the last week and that she is getting dehydrated.  No fever, chest pain, shortness of breath or diarrhea reported.  Patient describes her dizzy spell as mixed lightheaded and some spinning.  She had lost consciousness multiple times and found herself on the floor.    Of note: Patient is taking Abilify apparently not as directed.  She takes 20 mg twice daily however pharmacy were able to bring all prescription history was supposed to be 10 mg once daily.     ER course: Patient was awake, alert, oriented, no acute distress.  She was hemodynamically stable.  Labs shows hypokalemia 3.3, and evidence of acute kidney injury 3.86.  Troponin was negative x 2.  There is no leukocytosis.  CT chest abdomen pelvis shows no evidence of acute pathology.  CT head also negative for acute intracranial pathology.  CT cervical spine was negative for fracture.  Patient is admitted to medicine for further workup and management of repeated falls and syncopal episodes.    Past Medical History  Hypertension  Bipolar disorder  GERD  History of hepatitis C  Osteopenia  Schizoaffective disorder    Surgical History  She has a past surgical history that includes Other surgical history (09/21/2022).     Social History  History of alcohol abuse    Family History  Family History   Problem Relation Name Age of Onset    Dementia Mother      Other (CEREBRAL ACCIDENT) Father      Other (CEREBRAL VASCULAR ACCIDENT) Sister      Heart attack Sister          Allergies  Patient has no known allergies.    Review of Systems  10/10 points review of system were conducted, negative except as above.    Physical Exam  Constitutional:       Appearance: Normal appearance. She is not ill-appearing or diaphoretic.  "  HENT:      Head: Normocephalic and atraumatic.      Comments: Has alopecia     Nose: Nose normal.      Mouth/Throat:      Mouth: Mucous membranes are dry.   Eyes:      General: No scleral icterus.     Extraocular Movements: Extraocular movements intact.      Conjunctiva/sclera: Conjunctivae normal.      Pupils: Pupils are equal, round, and reactive to light.   Neck:      Vascular: No carotid bruit.   Cardiovascular:      Rate and Rhythm: Normal rate and regular rhythm.      Heart sounds: No murmur heard.  Pulmonary:      Effort: No respiratory distress.      Breath sounds: No stridor. No wheezing, rhonchi or rales.   Chest:      Chest wall: No tenderness.   Abdominal:      General: There is no distension.      Palpations: There is no mass.      Tenderness: There is no abdominal tenderness. There is no right CVA tenderness, left CVA tenderness or rebound.      Hernia: No hernia is present.   Musculoskeletal:         General: No tenderness.      Cervical back: Normal range of motion and neck supple. No rigidity or tenderness.      Right lower leg: No edema.      Left lower leg: No edema.   Lymphadenopathy:      Cervical: No cervical adenopathy.   Skin:     General: Skin is warm and dry.      Findings: No rash.   Neurological:      General: No focal deficit present.      Mental Status: She is alert and oriented to person, place, and time. Mental status is at baseline.   Psychiatric:         Mood and Affect: Mood normal.         Behavior: Behavior normal.          Last Recorded Vitals  Blood pressure 130/81, pulse 80, temperature 35.3 °C (95.5 °F), resp. rate 18, height 1.499 m (4' 11\"), weight 60.7 kg (133 lb 13.1 oz), SpO2 100%.    Relevant Results  Scheduled medications  ARIPiprazole, 10 mg, oral, Daily  folic acid, 1 mg, oral, Daily  pantoprazole, 40 mg, oral, Daily   Or  pantoprazole, 40 mg, intravenous, Daily  potassium chloride, 40 mEq, oral, Once  multivitamin, 1 each, oral, Daily  thiamine, 100 mg, oral, " Daily      Continuous medications  lactated Ringer's, 100 mL/hr, Last Rate: 100 mL/hr (08/08/24 0414)      PRN medications  PRN medications: acetaminophen **OR** acetaminophen **OR** acetaminophen, melatonin, ondansetron **OR** ondansetron, polyethylene glycol        Results for orders placed or performed during the hospital encounter of 08/07/24 (from the past 24 hour(s))   CBC and Auto Differential   Result Value Ref Range    WBC 5.0 4.4 - 11.3 x10*3/uL    nRBC 0.0 0.0 - 0.0 /100 WBCs    RBC 4.70 4.00 - 5.20 x10*6/uL    Hemoglobin 13.7 12.0 - 16.0 g/dL    Hematocrit 40.5 36.0 - 46.0 %    MCV 86 80 - 100 fL    MCH 29.1 26.0 - 34.0 pg    MCHC 33.8 32.0 - 36.0 g/dL    RDW 12.7 11.5 - 14.5 %    Platelets 318 150 - 450 x10*3/uL    Neutrophils % 41.5 40.0 - 80.0 %    Immature Granulocytes %, Automated 0.2 0.0 - 0.9 %    Lymphocytes % 47.6 13.0 - 44.0 %    Monocytes % 8.3 2.0 - 10.0 %    Eosinophils % 1.8 0.0 - 6.0 %    Basophils % 0.6 0.0 - 2.0 %    Neutrophils Absolute 2.09 1.20 - 7.70 x10*3/uL    Immature Granulocytes Absolute, Automated 0.01 0.00 - 0.70 x10*3/uL    Lymphocytes Absolute 2.40 1.20 - 4.80 x10*3/uL    Monocytes Absolute 0.42 0.10 - 1.00 x10*3/uL    Eosinophils Absolute 0.09 0.00 - 0.70 x10*3/uL    Basophils Absolute 0.03 0.00 - 0.10 x10*3/uL   Magnesium   Result Value Ref Range    Magnesium 2.15 1.60 - 2.40 mg/dL   Comprehensive metabolic panel   Result Value Ref Range    Glucose 94 74 - 99 mg/dL    Sodium 127 (L) 136 - 145 mmol/L    Potassium 3.3 (L) 3.5 - 5.3 mmol/L    Chloride 92 (L) 98 - 107 mmol/L    Bicarbonate 22 21 - 32 mmol/L    Anion Gap 16 10 - 20 mmol/L    Urea Nitrogen 54 (H) 6 - 23 mg/dL    Creatinine 3.86 (H) 0.50 - 1.05 mg/dL    eGFR 12 (L) >60 mL/min/1.73m*2    Calcium 9.8 8.6 - 10.3 mg/dL    Albumin 4.4 3.4 - 5.0 g/dL    Alkaline Phosphatase 82 33 - 136 U/L    Total Protein 8.6 (H) 6.4 - 8.2 g/dL    AST 18 9 - 39 U/L    Bilirubin, Total 0.4 0.0 - 1.2 mg/dL    ALT 10 7 - 45 U/L   Lipase    Result Value Ref Range    Lipase 41 9 - 82 U/L   Lactate   Result Value Ref Range    Lactate 1.5 0.4 - 2.0 mmol/L   Blood Gas Venous   Result Value Ref Range    POCT pH, Venous 7.37 7.33 - 7.43 pH    POCT pCO2, Venous 43 41 - 51 mm Hg    POCT pO2, Venous 30 (L) 35 - 45 mm Hg    POCT SO2, Venous 46 45 - 75 %    POCT Oxy Hemoglobin, Venous 45.5 45.0 - 75.0 %    POCT Base Excess, Venous -0.6 -2.0 - 3.0 mmol/L    POCT HCO3 Calculated, Venous 24.9 22.0 - 26.0 mmol/L    Patient Temperature      FiO2 21 %   Troponin I, High Sensitivity, Initial   Result Value Ref Range    Troponin I, High Sensitivity 11 0 - 13 ng/L   Acute Toxicology Panel, Blood   Result Value Ref Range    Acetaminophen <10.0 10.0 - 30.0 ug/mL    Salicylate  <3 4 - 20 mg/dL    Alcohol <10 <=10 mg/dL   Sars-CoV-2 PCR   Result Value Ref Range    Coronavirus 2019, PCR Not Detected Not Detected   Light Blue Top   Result Value Ref Range    Extra Tube Hold for add-ons.    Troponin, High Sensitivity, 1 Hour   Result Value Ref Range    Troponin I, High Sensitivity 10 0 - 13 ng/L       CT chest abdomen pelvis wo IV contrast    Result Date: 8/8/2024  Interpreted By:  Jaswant Jimenes, STUDY: CT CHEST ABDOMEN PELVIS WO CONTRAST;  8/8/2024 12:35 am   INDICATION: Signs/Symptoms:syncope, flank pain.   COMPARISON: None.   ACCESSION NUMBER(S): [id number]   ORDERING CLINICIAN: MARIBEL HERNANDEZ   TECHNIQUE: Contiguous axial images of the chest, abdomen, and pelvis were obtained after the intravenous administration of iodinated contrast. Coronal and sagittal reformatted images were reconstructed from the axial data. CT chest was obtained using PE angiographic protocol with MIP images created on a separate independent workstation.   FINDINGS: CT CHEST:   There is some streak artifact from the arms which were not raised.   Heart size within normal limits.   No effusion.   No significant adenopathy suspected.   Lungs are clear without consolidation.   CT ABDOMEN/PELVIS:    Scattered cysts are seen involving the liver and kidneys.   Noncontrast appearance of the liver, gallbladder, adrenals, pancreas and spleen are otherwise unremarkable.   No hydronephrosis or hydroureter.   No bowel obstruction. No appendicitis or colitis.   Calcified fibroid uterus seen.   No free fluid or significant adenopathy.   Mild calcification aorta.   No free fluid or significant adenopathy.   Mild-to-moderate spondylotic degeneration seen worst at L5-S1.       No significant acute process seen chest abdomen pelvis on noncontrast imaging.   Incidental findings as above   MACRO: None.   Signed by: Jaswant Jimenes 8/8/2024 1:39 AM Dictation workstation:   [id number]    CT head wo IV contrast    Result Date: 8/8/2024  Interpreted By:  Jaswant Jimenes, STUDY: CT HEAD WO IV CONTRAST; CT CERVICAL SPINE WO IV CONTRAST;  8/8/2024 12:35 am   INDICATION: Signs/Symptoms:syncope/fall.   COMPARISON: None.   ACCESSION NUMBER(S): [id number]; [id number]   ORDERING CLINICIAN: MARIBEL HERNANDEZ   TECHNIQUE: Noncontrast CT images of head. Axial noncontrast CT images of the cervical spine with coronal and sagittal reconstructed images.   FINDINGS: BRAIN PARENCHYMA:  Gray-white matter interfaces are  preserved. No mass effect or midline shift.   HEMORRHAGE: No acute intracranial hemorrhage. VENTRICLES and EXTRA-AXIAL SPACES: The ventricles and sulci are within normal limits in size for brain volume. No abnormal extraaxial fluid collection. EXTRACRANIAL SOFT TISSUES: Within normal limits. PARANASAL SINUSES/MASTOIDS:  The visualized paranasal sinuses and mastoid air cells are aerated. CALVARIUM: No depressed skull fracture. No destructive osseous lesion.   OTHER FINDINGS: None.   CERVICAL SPINE:   ALIGNMENT: Normal. VERTEBRAE: No acute fracture. SPINAL CANAL: Multilevel moderate degenerative disc disease seen. No critical spinal canal stenosis. PREVERTEBRAL SOFT TISSUES: No prevertebral soft tissue swelling. LUNG APICES: Imaged  portion of the lung apices are within normal limits.   OTHER FINDINGS: None.       No acute intracranial abnormality.   No acute fracture or traumatic subluxation of the cervical spine.   Moderate cervical spondylotic degenerative change.   MACRO: None   Signed by: Jaswant Jimenes 8/8/2024 1:21 AM Dictation workstation:   [id number]    CT cervical spine wo IV contrast    Result Date: 8/8/2024  Interpreted By:  Jaswant Jimenes, STUDY: CT HEAD WO IV CONTRAST; CT CERVICAL SPINE WO IV CONTRAST;  8/8/2024 12:35 am   INDICATION: Signs/Symptoms:syncope/fall.   COMPARISON: None.   ACCESSION NUMBER(S): [id number]; [id number]   ORDERING CLINICIAN: MARIBEL HERNANDEZ   TECHNIQUE: Noncontrast CT images of head. Axial noncontrast CT images of the cervical spine with coronal and sagittal reconstructed images.   FINDINGS: BRAIN PARENCHYMA:  Gray-white matter interfaces are  preserved. No mass effect or midline shift.   HEMORRHAGE: No acute intracranial hemorrhage. VENTRICLES and EXTRA-AXIAL SPACES: The ventricles and sulci are within normal limits in size for brain volume. No abnormal extraaxial fluid collection. EXTRACRANIAL SOFT TISSUES: Within normal limits. PARANASAL SINUSES/MASTOIDS:  The visualized paranasal sinuses and mastoid air cells are aerated. CALVARIUM: No depressed skull fracture. No destructive osseous lesion.   OTHER FINDINGS: None.   CERVICAL SPINE:   ALIGNMENT: Normal. VERTEBRAE: No acute fracture. SPINAL CANAL: Multilevel moderate degenerative disc disease seen. No critical spinal canal stenosis. PREVERTEBRAL SOFT TISSUES: No prevertebral soft tissue swelling. LUNG APICES: Imaged portion of the lung apices are within normal limits.   OTHER FINDINGS: None.       No acute intracranial abnormality.   No acute fracture or traumatic subluxation of the cervical spine.   Moderate cervical spondylotic degenerative change.   MACRO: None   Signed by: Jaswant Jimenes 8/8/2024 1:21 AM Dictation workstation:   [id number]        Assessment/Plan   Principal Problem:    Acute renal failure, unspecified acute renal failure type (CMS-[facility])  Active Problems:    Depression with anxiety    GERD (gastroesophageal reflux disease)    History of alcohol abuse    Hypertension, benign    Schizoaffective disorder, bipolar type (Multi)    Repeated falls    Syncope and collapse    Dizzy spells    Hypokalemia    Patient clinically dehydrated.  She is on diuretics hydrochlorothiazide not sure if she was taking as prescribed.  She has mild hypokalemia.  Patient apparently was not taking her psych medication as prescribed.  She was taking Abilify 20 mg twice daily however per pharmacy supposed to take 10 mg once daily.  Polypharmacy and mixing up medication possible reason for her dizzy spells.  -Continue telemetry monitoring.  -Monitor serum electrolytes and correct as indicated.  -Reduce the dose of Abilify to 10 mg daily.  -IV fluid hydration especially with dehydration and acute kidney injury.  -Thiamine and folic acid.  -Multivitamins.  -Obtain echocardiogram for syncope.  -Pantoprazole for GERD.  -OT/PT evaluation       Zhang Peterson MD

## 2024-08-08 NOTE — PROGRESS NOTES
Daily Progress Note    Shivani Neil is a 68 y.o. female on day 0 of admission presenting with Acute renal failure, unspecified acute renal failure type (CMS-HCC).    Subjective   Patient sitting in bedside chair eating lunch.  States that she was able to eat her sandwich but vomited after drinking her juice.  Denies hematemesis.  Patient endorses headache, nausea, fatigue, dizziness.  States that she has been able to urinate.  Denies hematuria, urgency, frequency.     Patient's daughter was at bedside in the afternoon.  States that she should only be taking Abilify 10 mg once daily.  Mentions that patient likes to be independent and takes medications on her own at home, lives with granddaughter.  Patient's daughter has noticed an increase in mood changes and she has not been eating or drinking at home.  Notes that her mom had multiple episodes of falling within the past month where she refused to go to the hospital.  Noticed that she is more off balance than normal.       Objective   Physical Exam  Constitutional:       Appearance: Normal appearance.   HENT:      Head: Normocephalic and atraumatic.      Mouth/Throat:      Mouth: Mucous membranes are moist.   Eyes:      Extraocular Movements: Extraocular movements intact.      Conjunctiva/sclera: Conjunctivae normal.      Pupils: Pupils are equal, round, and reactive to light.   Cardiovascular:      Rate and Rhythm: Normal rate and regular rhythm.      Heart sounds: Normal heart sounds, S1 normal and S2 normal.   Pulmonary:      Effort: Pulmonary effort is normal.      Breath sounds: Normal breath sounds.   Abdominal:      General: Bowel sounds are normal.      Palpations: Abdomen is soft.      Tenderness: There is no abdominal tenderness.   Musculoskeletal:         General: Normal range of motion.      Cervical back: Neck supple.   Skin:     General: Skin is warm.   Neurological:      Mental Status: She is alert and oriented to person, place, and time.  "  Psychiatric:         Mood and Affect: Mood normal.         Behavior: Behavior normal.         Last Recorded Vitals  Blood pressure 145/62, pulse 79, temperature 35.6 °C (96.1 °F), resp. rate 18, height 1.499 m (4' 11\"), weight 60.7 kg (133 lb 13.1 oz), SpO2 100%.  Intake/Output last 3 Shifts:  No intake/output data recorded.    Medications  Scheduled medications  ARIPiprazole, 10 mg, oral, Daily  folic acid, 1 mg, oral, Daily  multivitamin with minerals, 1 tablet, oral, Daily  pantoprazole, 40 mg, oral, Daily   Or  pantoprazole, 40 mg, intravenous, Daily  potassium chloride, 20 mEq, intravenous, BID  thiamine, 100 mg, oral, Daily      Continuous medications  lactated Ringer's, 100 mL/hr, Last Rate: 100 mL/hr (08/08/24 1006)      PRN medications  PRN medications: acetaminophen **OR** acetaminophen **OR** acetaminophen, melatonin, ondansetron **OR** ondansetron, polyethylene glycol    Labs  CBC:   Results from last 7 days   Lab Units 08/08/24  0539 08/07/24  2340   WBC AUTO x10*3/uL 4.8 5.0   RBC AUTO x10*6/uL 3.94* 4.70   HEMOGLOBIN g/dL 11.6* 13.7   HEMATOCRIT % 33.6* 40.5   MCV fL 85 86   MCH pg 29.4 29.1   MCHC g/dL 34.5 33.8   RDW % 12.6 12.7   PLATELETS AUTO x10*3/uL 273 318     CMP:    Results from last 7 days   Lab Units 08/08/24  0539 08/07/24  2340   SODIUM mmol/L 130* 127*   POTASSIUM mmol/L 3.0* 3.3*   CHLORIDE mmol/L 96* 92*   CO2 mmol/L 22 22   BUN mg/dL 49* 54*   CREATININE mg/dL 3.15* 3.86*   GLUCOSE mg/dL 84 94   PROTEIN TOTAL g/dL  --  8.6*   CALCIUM mg/dL 9.1 9.8   BILIRUBIN TOTAL mg/dL  --  0.4   ALK PHOS U/L  --  82   AST U/L  --  18   ALT U/L  --  10     BMP:    Results from last 7 days   Lab Units 08/08/24  0539 08/07/24  2340   SODIUM mmol/L 130* 127*   POTASSIUM mmol/L 3.0* 3.3*   CHLORIDE mmol/L 96* 92*   CO2 mmol/L 22 22   BUN mg/dL 49* 54*   CREATININE mg/dL 3.15* 3.86*   CALCIUM mg/dL 9.1 9.8   GLUCOSE mg/dL 84 94     Magnesium:  Results from last 7 days   Lab Units 08/07/24  2340 " "  MAGNESIUM mg/dL 2.15     Troponin:    Results from last 7 days   Lab Units 08/08/24  0040 08/07/24  2340   TROPHS ng/L 10 11       Relevant Results  Results from last 7 days   Lab Units 08/08/24  0539 08/07/24  2340   GLUCOSE mg/dL 84 94     No results found for: \"HGBA1C\"     Assessment/Plan  Acute renal failure  -Urine culture pending  -Continue lactated Ringer's  -Continue to monitor creatinine, today 3.15  -Holding lisinopril and hydrochlorothiazide for now due to decreased kidney function  -Replace electrolytes as needed    Dizzy spells, possible etiology of polypharmacy  Syncope and collapse  Mechanical fall  -CT head without intracranial abnormality or fractures  -CT cervical spine no acute fracture  -CT chest abdomen pelvis without significant acute process  -TTE shows LVEF 60 to 65% without wall motion abnormalities  -Continue telemetry monitoring  -Cardiology consult, recommend orthostatic vitals, Holter monitor and follow-up outpatient  -PT/OT consult    Depression with anxiety  Schizoaffective disorder, bipolar type  History of alcohol abuse  -Continue thiamine and folic acid  -Continue Abilify 10 mg daily  -Resume home meds    GERD: Continue Protonix  HTN: Holding home meds for now due to kidney function    DVTp: SCDs      Brina Medina PA-C    Plan of care was discussed extensively with patient.  Patient verbalized understanding through teach back method.  All question and concerns addressed upon examination.    Of note, this documentation is completed using the Dragon Dictation system (voice recognition software). There may be spelling and/or grammatical errors that were not corrected prior to final submission.        "

## 2024-08-08 NOTE — PROGRESS NOTES
Occupational Therapy    Evaluation    Patient Name: Shivani Neil  MRN: [id number]  Today's Date: 8/8/2024  Time Calculation  Start Time: 0930  Stop Time: 0944  Time Calculation (min): 14 min  1103/1103-A    Assessment  IP OT Assessment  OT Assessment: Decreased endurance, strength, and balance impacting ADL/functional transfer status  Prognosis: Good  Evaluation/Treatment Tolerance: Patient tolerated treatment well  Medical Staff Made Aware: Yes  End of Session Communication: Bedside nurse  End of Session Patient Position: Up in chair, Alarm on    Plan:  Treatment Interventions: ADL retraining, Functional transfer training, UE strengthening/ROM, Endurance training, Patient/family training  OT Frequency: 2 times per week  OT Discharge Recommendations: Low intensity level of continued care  Equipment Recommended upon Discharge:  (TBD)  OT - OK to Discharge: Yes (ok to d/c to next level of care once medically cleared)    Subjective     Current Problem:  1. Acute renal failure, unspecified acute renal failure type (CMS-HCC)        2. Syncope and collapse  Transthoracic Echo (TTE) Complete    Transthoracic Echo (TTE) Complete    Holter Or Event Cardiac Monitor          General:  General  Reason for Referral: self care impairment  Referred By: Dr. Peterson (PT/OT 8/8)  Past Medical History Relevant to Rehab: includes: HTN, depression, anxiety, Bipolar, Hep C, schizophrenia, alopecia, osteopenia, ETOH, falls  Family/Caregiver Present: No  Prior to Session Communication: Bedside nurse  Patient Position Received: Bed, 2 rail up, Alarm on (IV, tele)  General Comment: Pt. is a 69yo who presented to [facility] ED on 8/7/2024 following multiple syncopal episodes with falls. Pt. has been unable to eat or drink. (+) emesis.  Chest/ABD/Pelvic CT (8/8) (-) acute findings  Head CT (8/8) (-) acute findings  C-spine CT (8/8) (-) acute findings  Hgb (8/8) 11.6  K (8/8) 3.0  Na (8/8) 130 trending up  Dx: syncope acute renal  failure    Precautions:  Medical Precautions: Fall precautions      Pain:  Pain Assessment  Pain Assessment: 0-10  0-10 (Numeric) Pain Score: 0 - No pain    Objective     Cognition:  Overall Cognitive Status: Within Functional Limits  Orientation Level: Oriented X4             Home Living:  Home Living Comments: Pt lives with granddaughter in 2-level home with no steps to enter. Bed/bath on 2nd floor with tub shower. No seat or grab bars. 1/2 bath on first floor.     Prior Function:  Prior Function Comments: Pt. amb without AD PTA. Pt was I with ADLs and IADLs PTA. Pt. reported multiple falls in last 3 months. Pt. does not drive.    ADL:  Eating Assistance: Independent  Grooming Assistance: Stand by  Bathing Assistance: Stand by  UE Dressing Assistance: Stand by  LE Dressing Assistance: Minimal  Toileting Assistance with Device: Minimal    Activity Tolerance:  Endurance: Decreased tolerance for upright activites    Bed Mobility/Transfers:   Bed Mobility  Bed Mobility: Yes (Supine to sit EOB with SBA)  Transfers  Transfer: Yes (Sit to/from stand without device with CGA)    Ambulation/Gait Training:  Functional Mobility  Functional Mobility Performed: Yes (Pt ambulated short functional distances throughout room without device with CGA for safety.)    Sitting Balance:  Static Sitting Balance  Static Sitting-Comment/Number of Minutes: good  Dynamic Sitting Balance  Dynamic Sitting-Comments: good    Standing Balance:  Static Standing Balance  Static Standing-Comment/Number of Minutes: fair  Dynamic Standing Balance  Dynamic Standing-Comments: fair    Strength:  Strength Comments: BUE strength grossly 4/5    Hand Function:  Hand Function  Gross Grasp: Functional    Extremities: RUE   RUE : Within Functional Limits and LUE   LUE: Within Functional Limits    Outcome Measures: [facility] Daily Activity  Putting on and taking off regular lower body clothing: A little  Bathing (including washing, rinsing, drying): A little  Putting  on and taking off regular upper body clothing: A little  Toileting, which includes using toilet, bedpan or urinal: A little  Taking care of personal grooming such as brushing teeth: A little  Eating Meals: None  Daily Activity - Total Score: 19                    EDUCATION:  Education  Individual(s) Educated: Patient  Education Provided: Fall precautons, Risk and benefits of OT discussed with patient or other, POC discussed and agreed upon  Education Documentation  Body Mechanics, taught by Jesusita Jimenez OT at 8/8/2024  2:22 PM.  Learner: Patient  Readiness: Acceptance  Method: Explanation, Demonstration  Response: Verbalizes Understanding, Demonstrated Understanding, Needs Reinforcement    ADL Training, taught by Jesusita iJmenez OT at 8/8/2024  2:22 PM.  Learner: Patient  Readiness: Acceptance  Method: Explanation, Demonstration  Response: Verbalizes Understanding, Demonstrated Understanding, Needs Reinforcement      Goals:   Encounter Problems       Encounter Problems (Active)       OT Goals       Pt will complete all functional transfers with MOD I (Progressing)       Start:  08/08/24    Expected End:  08/22/24            Pt will complete all functional mobility with MOD I (Progressing)       Start:  08/08/24    Expected End:  08/22/24            Pt will complete LB dressing with MOD I (Progressing)       Start:  08/08/24    Expected End:  08/22/24            Pt will tolerate 10 mins of functional activities with 2 or fewer rest breaks (Progressing)       Start:  08/08/24    Expected End:  08/22/24

## 2024-08-09 LAB
ANION GAP SERPL CALC-SCNC: 10 MMOL/L (ref 10–20)
BACTERIA UR CULT: NORMAL
BUN SERPL-MCNC: 30 MG/DL (ref 6–23)
CALCIUM SERPL-MCNC: 9.5 MG/DL (ref 8.6–10.3)
CHLORIDE SERPL-SCNC: 103 MMOL/L (ref 98–107)
CO2 SERPL-SCNC: 27 MMOL/L (ref 21–32)
CREAT SERPL-MCNC: 1.92 MG/DL (ref 0.5–1.05)
EGFRCR SERPLBLD CKD-EPI 2021: 28 ML/MIN/1.73M*2
ERYTHROCYTE [DISTWIDTH] IN BLOOD BY AUTOMATED COUNT: 12.8 % (ref 11.5–14.5)
GLUCOSE SERPL-MCNC: 96 MG/DL (ref 74–99)
HCT VFR BLD AUTO: 35.7 % (ref 36–46)
HGB BLD-MCNC: 11.8 G/DL (ref 12–16)
HOLD SPECIMEN: NORMAL
MAGNESIUM SERPL-MCNC: 1.63 MG/DL (ref 1.6–2.4)
MCH RBC QN AUTO: 29.1 PG (ref 26–34)
MCHC RBC AUTO-ENTMCNC: 33.1 G/DL (ref 32–36)
MCV RBC AUTO: 88 FL (ref 80–100)
NRBC BLD-RTO: 0 /100 WBCS (ref 0–0)
PLATELET # BLD AUTO: 255 X10*3/UL (ref 150–450)
POTASSIUM SERPL-SCNC: 3.9 MMOL/L (ref 3.5–5.3)
RBC # BLD AUTO: 4.05 X10*6/UL (ref 4–5.2)
SODIUM SERPL-SCNC: 136 MMOL/L (ref 136–145)
WBC # BLD AUTO: 3.9 X10*3/UL (ref 4.4–11.3)

## 2024-08-09 PROCEDURE — 97530 THERAPEUTIC ACTIVITIES: CPT | Mod: GP,CQ

## 2024-08-09 PROCEDURE — 36415 COLL VENOUS BLD VENIPUNCTURE: CPT

## 2024-08-09 PROCEDURE — 1200000002 HC GENERAL ROOM WITH TELEMETRY DAILY

## 2024-08-09 PROCEDURE — 2500000004 HC RX 250 GENERAL PHARMACY W/ HCPCS (ALT 636 FOR OP/ED)

## 2024-08-09 PROCEDURE — 2500000001 HC RX 250 WO HCPCS SELF ADMINISTERED DRUGS (ALT 637 FOR MEDICARE OP)

## 2024-08-09 PROCEDURE — 85027 COMPLETE CBC AUTOMATED: CPT

## 2024-08-09 PROCEDURE — 82374 ASSAY BLOOD CARBON DIOXIDE: CPT

## 2024-08-09 PROCEDURE — 83735 ASSAY OF MAGNESIUM: CPT

## 2024-08-09 PROCEDURE — 99233 SBSQ HOSP IP/OBS HIGH 50: CPT | Performed by: INTERNAL MEDICINE

## 2024-08-09 PROCEDURE — 2500000001 HC RX 250 WO HCPCS SELF ADMINISTERED DRUGS (ALT 637 FOR MEDICARE OP): Performed by: INTERNAL MEDICINE

## 2024-08-09 PROCEDURE — 99232 SBSQ HOSP IP/OBS MODERATE 35: CPT

## 2024-08-09 RX ORDER — BUPROPION HYDROCHLORIDE 150 MG/1
300 TABLET ORAL
Status: DISCONTINUED | OUTPATIENT
Start: 2024-08-10 | End: 2024-08-10 | Stop reason: HOSPADM

## 2024-08-09 RX ORDER — DEUTETRABENAZINE 12 MG/1
1 TABLET, COATED ORAL
COMMUNITY
Start: 2024-07-10

## 2024-08-09 RX ORDER — BUPROPION HYDROCHLORIDE 300 MG/1
300 TABLET ORAL
COMMUNITY
Start: 2024-07-10

## 2024-08-09 ASSESSMENT — PAIN SCALES - GENERAL
PAINLEVEL_OUTOF10: 0 - NO PAIN

## 2024-08-09 ASSESSMENT — PAIN - FUNCTIONAL ASSESSMENT
PAIN_FUNCTIONAL_ASSESSMENT: 0-10

## 2024-08-09 ASSESSMENT — COGNITIVE AND FUNCTIONAL STATUS - GENERAL
CLIMB 3 TO 5 STEPS WITH RAILING: A LOT
DAILY ACTIVITIY SCORE: 21
HELP NEEDED FOR BATHING: A LITTLE
MOVING TO AND FROM BED TO CHAIR: A LITTLE
MOBILITY SCORE: 17
WALKING IN HOSPITAL ROOM: A LITTLE
MOVING FROM LYING ON BACK TO SITTING ON SIDE OF FLAT BED WITH BEDRAILS: A LITTLE
STANDING UP FROM CHAIR USING ARMS: A LITTLE
MOVING TO AND FROM BED TO CHAIR: A LITTLE
MOBILITY SCORE: 20
CLIMB 3 TO 5 STEPS WITH RAILING: A LOT
DRESSING REGULAR LOWER BODY CLOTHING: A LITTLE
TURNING FROM BACK TO SIDE WHILE IN FLAT BAD: A LITTLE
DRESSING REGULAR UPPER BODY CLOTHING: A LITTLE
STANDING UP FROM CHAIR USING ARMS: A LITTLE

## 2024-08-09 NOTE — PROGRESS NOTES
Formerly [facility] Heart Progress Note           Rounding BIANCA/Cardiologist:  Cedrick Delacruz DO, Dr. Cedrick Delacruz  Primary Cardiologist:  None     Date:  8/9/2024  Patient:  Shivani Neil  YOB: 1955  MRN:  [id number]   Admit Date:  8/7/2024      SUBJECTIVE:    Shivani Neil was seen and examined today at bedside.   Patient feeling much better today.  She denies lightheadedness, dizziness, shortness of breath, chest pain, palpitations, nausea or vomiting.  Her renal function is improving.  Potassium now normal.  Transthoracic echocardiogram with normal LV systolic function no significant valvular abnormalities.  Review of telemetry shows sinus rhythm with heart rates 60s to 80s.    VITALS:     Vitals:    08/08/24 1403 08/08/24 1404 08/08/24 1405 08/09/24 0508   BP: 144/64 133/60 145/62 159/75   BP Location:       Patient Position: Lying Sitting Standing    Pulse: 74 73 79    Resp: 18 18 18 19   Temp: 35.6 °C (96.1 °F) 35.6 °C (96.1 °F) 35.6 °C (96.1 °F) 36 °C (96.8 °F)   TempSrc:       SpO2: 99% 98% 100% 97%   Weight:       Height:           Intake/Output Summary (Last 24 hours) at 8/9/2024 1150  Last data filed at 8/8/2024 2138  Gross per 24 hour   Intake 413.33 ml   Output --   Net 413.33 ml       Wt Readings from Last 4 Encounters:   08/08/24 60.7 kg (133 lb 13.1 oz)   12/12/23 68.6 kg (151 lb 3.2 oz)   10/02/23 68.8 kg (151 lb 9.6 oz)   09/21/22 64.9 kg (143 lb 2 oz)       CURRENT HOSPITAL MEDICATIONS:   ARIPiprazole, 10 mg, oral, Daily  deutetrabenazine, 9 mg, oral, BID  DULoxetine, 60 mg, oral, Daily  folic acid, 1 mg, oral, Daily  [Held by provider] hydroCHLOROthiazide, 25 mg, oral, Daily  [Held by provider] lisinopril, 10 mg, oral, Daily  multivitamin with minerals, 1 tablet, oral, Daily  pantoprazole, 40 mg, oral, Daily   Or  pantoprazole, 40 mg, intravenous, Daily  thiamine, 100 mg, oral, Daily      lactated Ringer's, 100 mL/hr, Last Rate: 100 mL/hr (08/09/24 1121)      Current  Outpatient Medications   Medication Instructions    ARIPiprazole (ABILIFY) 10 mg, oral, Daily    Austedo 9 mg, oral, 2 times daily    DULoxetine (CYMBALTA) 60 mg, oral, Daily    haloperidol (HALDOL) 10 mg    hydroCHLOROthiazide (HYDRODIURIL) 25 mg, oral, Daily    ibandronate (BONIVA) 150 mg, oral, Every 30 days, take with 8 to 10 ounces of water. Stay upright and do not eat or drink for 1 hour    lisinopril 10 mg, oral, Daily    naloxone (Narcan) 4 mg/0.1 mL nasal spray USE 1 SPRAY ONCE AS NEEDED FOR OPIOD OVERDOSE    naproxen (NAPROSYN) 500 mg, oral, 2 times daily    omeprazole (PRILOSEC) 40 mg, oral, Daily        PHYSICAL EXAMINATION:   GENERAL:  Well developed, well nourished, in no acute distress.  CHEST:  Symmetric and nontender.  NEURO/PSYCH:  Alert and oriented times three with approppriate behavior and responses.  NECK:  Supple, no JVD, no bruit.  LUNGS:  Clear to auscultation bilaterally, normal respiratory effort.  HEART:  Rate and rhythm regular no murmur, no gallop appreciated.   EXTREMITIES:  Warm with good color, no clubbing or cyanosis.  No lower extremity edema noted.  PERIPHERAL VASCULAR:  Pulses present and equally palpable; 2+     LAB DATA:     CBC:   Results from last 7 days   Lab Units 08/09/24 0927 08/08/24  0539 08/07/24  2340   WBC AUTO x10*3/uL 3.9* 4.8 5.0   RBC AUTO x10*6/uL 4.05 3.94* 4.70   HEMOGLOBIN g/dL 11.8* 11.6* 13.7   HEMATOCRIT % 35.7* 33.6* 40.5   MCV fL 88 85 86   MCH pg 29.1 29.4 29.1   MCHC g/dL 33.1 34.5 33.8   RDW % 12.8 12.6 12.7   PLATELETS AUTO x10*3/uL 255 273 318     CMP:    Results from last 7 days   Lab Units 08/09/24 0927 08/08/24  0539 08/07/24  2340   SODIUM mmol/L 136 130* 127*   POTASSIUM mmol/L 3.9 3.0* 3.3*   CHLORIDE mmol/L 103 96* 92*   CO2 mmol/L 27 22 22   BUN mg/dL 30* 49* 54*   CREATININE mg/dL 1.92* 3.15* 3.86*   GLUCOSE mg/dL 96 84 94   PROTEIN TOTAL g/dL  --   --  8.6*   CALCIUM mg/dL 9.5 9.1 9.8   BILIRUBIN TOTAL mg/dL  --   --  0.4   ALK PHOS U/L   --   --  82   AST U/L  --   --  18   ALT U/L  --   --  10     BMP:    Results from last 7 days   Lab Units 08/09/24  0927 08/08/24  0539 08/07/24  2340   SODIUM mmol/L 136 130* 127*   POTASSIUM mmol/L 3.9 3.0* 3.3*   CHLORIDE mmol/L 103 96* 92*   CO2 mmol/L 27 22 22   BUN mg/dL 30* 49* 54*   CREATININE mg/dL 1.92* 3.15* 3.86*   CALCIUM mg/dL 9.5 9.1 9.8   GLUCOSE mg/dL 96 84 94     Magnesium:  Results from last 7 days   Lab Units 08/09/24  0927 08/07/24  2340   MAGNESIUM mg/dL 1.63 2.15     Troponin:    Results from last 7 days   Lab Units 08/08/24  0040 08/07/24  2340   TROPHS ng/L 10 11     BNP:     Lipid Panel:         DIAGNOSTIC TESTING:   @No results found for this or any previous visit.    Transthoracic Echo (TTE) Complete    Result Date: 8/8/2024          [place] [address]  Tel [phone] Fax [phone] TRANSTHORACIC ECHOCARDIOGRAM REPORT Patient Name:      ALEX WASHINGTON     Eugenio Physician:    [id number] Cedrick Delacruz DO Study Date:        8/8/2024             Ordering Provider:    [id number] SANCHEZ MRN/PID:           [id number]             Fellow: Accession#:        [id number]         Nurse:                Christen Fisher Date of Birth/Age: 1955 / 68 years Sonographer:          Muriel Wang                                                               [facility] Gender:            F                    Additional Staff: Height:            149.86 cm            Admit Date:           8/7/2024 Weight:            60.33 kg             Admission Status:     Inpatient -                                                               Routine BSA / BMI:         1.55 m2 / 26.86      Department Location:  [facility]/                                      Echo Lab Blood Pressure: 130 /81 mmHg Study Type:    TRANSTHORACIC ECHO  (TTE) COMPLETE Diagnosis/ICD: Syncope and collapse-R55 Indication:    Syncope CPT Codes:     Echo Complete w Full [id number] Patient History: Pertinent History: HTN and Dizziness. Study Detail: The following Echo studies were performed: 2D, M-Mode, Doppler and               color flow. Agitated saline used as a contrast agent for               intraseptal flow evaluation. The patient was awake.      PHYSICIAN INTERPRETATION: Left Ventricle: Left ventricular ejection fraction is normal, by visual estimate at 60-65%. There are no regional wall motion abnormalities. The left ventricular cavity size is normal. Spectral Doppler shows an impaired relaxation pattern of left ventricular diastolic filling. LV Wall Scoring: All segments are normal. Left Atrium: The left atrium is normal in size. A bubble study using agitated saline was performed. Bubble study is negative. Right Ventricle: The right ventricle is normal in size. There is normal right ventricular global systolic function. Right Atrium: The right atrium is normal in size. Aortic Valve: The aortic valve appears structurally normal. The aortic valve appears tricuspid. There is no evidence of aortic valve stenosis. The aortic valve dimensionless index is 0.73. There is no evidence of aortic valve regurgitation. The peak instantaneous gradient of the aortic valve is 6.8 mmHg. The mean gradient of the aortic valve is 4.0 mmHg. Mitral Valve: The mitral valve is normal in structure. There is no evidence of mitral valve stenosis. There is normal mitral valve leaflet mobility. There is no evidence of mitral valve regurgitation. Tricuspid Valve: The tricuspid valve is structurally normal. There is normal tricuspid valve leaflet mobility. There is trace tricuspid regurgitation. Pulmonic Valve: The pulmonic valve is structurally normal. There is no indication of pulmonic valve regurgitation. Pericardium: There is a trivial pericardial effusion. Aorta: The aortic root is  normal. Pulmonary Artery: The main pulmonary artery is normal in size, and position, with normal bifurcation into the left and right pulmonary arteries. Systemic Veins: The inferior vena cava appears to be of normal size. In comparison to the previous echocardiogram(s): Compared with study dated 12/28/2023, no significant change.      CONCLUSIONS:    1. Left ventricular ejection fraction is normal, by visual estimate at 60-65%.    2. No regional wall motion abnormalities.    3. Spectral Doppler shows an impaired relaxation pattern of left ventricular diastolic filling.    4. There is normal right ventricular global systolic function.    5. There is no evidence of mitral valve stenosis.    6. No evidence of mitral valve regurgitation.    7. Trace tricuspid regurgitation is visualized.    8. Aortic valve stenosis is not present.    9. The main pulmonary artery is normal in size, and position, with normal bifurcation into the left and right pulmonary arteries.     RECOMMENDATIONS: Transthoracic echo has low negative predictive value for mass, vegetation, or embolic source. Consider MASSIEL or MRI if clinically indicated.  QUANTITATIVE DATA SUMMARY: 2D MEASUREMENTS:                          Normal Ranges: Ao Root d:     2.73 cm   (2.0-3.7cm) LAs:           3.23 cm   (2.7-4.0cm) IVSd:          0.95 cm   (0.6-1.1cm) LVPWd:         0.88 cm   (0.6-1.1cm) LVIDd:         3.31 cm   (3.9-5.9cm) LVIDs:         2.17 cm LV Mass Index: 54.0 g/m2 LV % FS        34.4 % LA VOLUME:                               Normal Ranges: LA Vol A4C:        18.5 ml    (22+/-6mL/m2) LA Vol A2C:        16.8 ml LA Vol BP:         18.6 ml LA Vol Index A4C:  11.9ml/m2 LA Vol Index A2C:  10.9 ml/m2 LA Vol Index BP:   12.0 ml/m2 LA Area A4C:       9.3 cm2 LA Area A2C:       9.3 cm2 LA Major Axis A4C: 4.0 cm LA Major Axis A2C: 4.4 cm LA Volume Index:   11.2 ml/m2 RA VOLUME BY A/L METHOD:                               Normal Ranges: RA Vol A4C:        19.3 ml     (8.3-19.5ml) RA Vol Index A4C:  12.5 ml/m2 RA Area A4C:       9.7 cm2 RA Major Axis A4C: 4.1 cm AORTA MEASUREMENTS:                    Normal Ranges: Asc Ao, d: 2.92 cm (2.1-3.4cm) LV SYSTOLIC FUNCTION BY 2D PLANIMETRY (MOD):                      Normal Ranges: EF-A4C View:    66 % (>=55%) EF-A2C View:    68 % EF-Biplane:     66 % EF-Visual:      63 % LV EF Reported: 63 % LV DIASTOLIC FUNCTION:                         Normal Ranges: MV Peak E:    0.70 m/s  (0.7-1.2 m/s) MV Peak A:    1.08 m/s  (0.42-0.7 m/s) E/A Ratio:    0.65      (1.0-2.2) MV e'         0.092 m/s (>8.0) MV lateral e' 0.11 m/s MV medial e'  0.07 m/s E/e' Ratio:   7.59      (<8.0) MITRAL VALVE:                 Normal Ranges: MV DT: 263 msec (150-240msec) AORTIC VALVE:                                   Normal Ranges: AoV Vmax:                1.30 m/s (<=1.7m/s) AoV Peak P.8 mmHg (<20mmHg) AoV Mean P.0 mmHg (1.7-11.5mmHg) LVOT Max Abel:            0.98 m/s (<=1.1m/s) AoV VTI:                 25.70 cm (18-25cm) LVOT VTI:                18.80 cm LVOT Diameter:           1.72 cm  (1.8-2.4cm) AoV Area, VTI:           1.70 cm2 (2.5-5.5cm2) AoV Area,Vmax:           1.76 cm2 (2.5-4.5cm2) AoV Dimensionless Index: 0.73  RIGHT VENTRICLE: RV Basal 2.65 cm RV Mid   2.06 cm RV Major 6.1 cm TAPSE:   15.0 mm RV s'    0.14 m/s TRICUSPID VALVE/RVSP:                             Normal Ranges: Peak TR Velocity: 1.74 m/s RV Syst Pressure: 15.1 mmHg (< 30mmHg) IVC Diam:         0.92 cm PULMONIC VALVE:                         Normal Ranges: PV Accel Time: 66 msec  (>120ms) PV Max Abel:    0.8 m/s  (0.6-0.9m/s) PV Max P.7 mmHg  [id number] Cedrick Delacruz DO Electronically signed on 2024 at 10:58:59 AM  Wall Scoring  ** Final **     ECG 12 lead    Result Date: 2024  Normal sinus rhythm Normal ECG When compared with ECG of 07-AUG-2024 23:17, (unconfirmed) Premature ventricular complexes are no longer Present Premature atrial complexes  are no longer Present Confirmed by Cedrick Delacruz ([id number]) on 8/8/2024 10:02:17 AM    CT chest abdomen pelvis wo IV contrast    Result Date: 8/8/2024  Interpreted By:  Jaswant Jimenes, STUDY: CT CHEST ABDOMEN PELVIS WO CONTRAST;  8/8/2024 12:35 am   INDICATION: Signs/Symptoms:syncope, flank pain.   COMPARISON: None.   ACCESSION NUMBER(S): [id number]   ORDERING CLINICIAN: MARIBEL HERNANDEZ   TECHNIQUE: Contiguous axial images of the chest, abdomen, and pelvis were obtained after the intravenous administration of iodinated contrast. Coronal and sagittal reformatted images were reconstructed from the axial data. CT chest was obtained using PE angiographic protocol with MIP images created on a separate independent workstation.   FINDINGS: CT CHEST:   There is some streak artifact from the arms which were not raised.   Heart size within normal limits.   No effusion.   No significant adenopathy suspected.   Lungs are clear without consolidation.   CT ABDOMEN/PELVIS:   Scattered cysts are seen involving the liver and kidneys.   Noncontrast appearance of the liver, gallbladder, adrenals, pancreas and spleen are otherwise unremarkable.   No hydronephrosis or hydroureter.   No bowel obstruction. No appendicitis or colitis.   Calcified fibroid uterus seen.   No free fluid or significant adenopathy.   Mild calcification aorta.   No free fluid or significant adenopathy.   Mild-to-moderate spondylotic degeneration seen worst at L5-S1.       No significant acute process seen chest abdomen pelvis on noncontrast imaging.   Incidental findings as above   MACRO: None.   Signed by: Jaswant Jimenes 8/8/2024 1:39 AM Dictation workstation:   [id number]    CT head wo IV contrast    Result Date: 8/8/2024  Interpreted By:  Jaswant Jimenes, STUDY: CT HEAD WO IV CONTRAST; CT CERVICAL SPINE WO IV CONTRAST;  8/8/2024 12:35 am   INDICATION: Signs/Symptoms:syncope/fall.   COMPARISON: None.   ACCESSION NUMBER(S): [id number]; [id number]   ORDERING  CLINICIAN: MARIBEL HERNANDEZ   TECHNIQUE: Noncontrast CT images of head. Axial noncontrast CT images of the cervical spine with coronal and sagittal reconstructed images.   FINDINGS: BRAIN PARENCHYMA:  Gray-white matter interfaces are  preserved. No mass effect or midline shift.   HEMORRHAGE: No acute intracranial hemorrhage. VENTRICLES and EXTRA-AXIAL SPACES: The ventricles and sulci are within normal limits in size for brain volume. No abnormal extraaxial fluid collection. EXTRACRANIAL SOFT TISSUES: Within normal limits. PARANASAL SINUSES/MASTOIDS:  The visualized paranasal sinuses and mastoid air cells are aerated. CALVARIUM: No depressed skull fracture. No destructive osseous lesion.   OTHER FINDINGS: None.   CERVICAL SPINE:   ALIGNMENT: Normal. VERTEBRAE: No acute fracture. SPINAL CANAL: Multilevel moderate degenerative disc disease seen. No critical spinal canal stenosis. PREVERTEBRAL SOFT TISSUES: No prevertebral soft tissue swelling. LUNG APICES: Imaged portion of the lung apices are within normal limits.   OTHER FINDINGS: None.       No acute intracranial abnormality.   No acute fracture or traumatic subluxation of the cervical spine.   Moderate cervical spondylotic degenerative change.   MACRO: None   Signed by: Jaswant Jimenes 8/8/2024 1:21 AM Dictation workstation:   [id number]    CT cervical spine wo IV contrast    Result Date: 8/8/2024  Interpreted By:  Jaswant Jimenes, STUDY: CT HEAD WO IV CONTRAST; CT CERVICAL SPINE WO IV CONTRAST;  8/8/2024 12:35 am   INDICATION: Signs/Symptoms:syncope/fall.   COMPARISON: None.   ACCESSION NUMBER(S): [id number]; [id number]   ORDERING CLINICIAN: MARIBEL HERNANDEZ   TECHNIQUE: Noncontrast CT images of head. Axial noncontrast CT images of the cervical spine with coronal and sagittal reconstructed images.   FINDINGS: BRAIN PARENCHYMA:  Gray-white matter interfaces are  preserved. No mass effect or midline shift.   HEMORRHAGE: No acute intracranial hemorrhage. VENTRICLES and  EXTRA-AXIAL SPACES: The ventricles and sulci are within normal limits in size for brain volume. No abnormal extraaxial fluid collection. EXTRACRANIAL SOFT TISSUES: Within normal limits. PARANASAL SINUSES/MASTOIDS:  The visualized paranasal sinuses and mastoid air cells are aerated. CALVARIUM: No depressed skull fracture. No destructive osseous lesion.   OTHER FINDINGS: None.   CERVICAL SPINE:   ALIGNMENT: Normal. VERTEBRAE: No acute fracture. SPINAL CANAL: Multilevel moderate degenerative disc disease seen. No critical spinal canal stenosis. PREVERTEBRAL SOFT TISSUES: No prevertebral soft tissue swelling. LUNG APICES: Imaged portion of the lung apices are within normal limits.   OTHER FINDINGS: None.       No acute intracranial abnormality.   No acute fracture or traumatic subluxation of the cervical spine.   Moderate cervical spondylotic degenerative change.   MACRO: None   Signed by: Jaswant Jimenes 8/8/2024 1:21 AM Dictation workstation:   [id number]       Transthoracic Echo (TTE) Complete   Final Result      CT chest abdomen pelvis wo IV contrast   Final Result   No significant acute process seen chest abdomen pelvis on noncontrast   imaging.        Incidental findings as above        MACRO:   None.        Signed by: Jaswant Jimenes 8/8/2024 1:39 AM   Dictation workstation:   [id number]      CT head wo IV contrast   Final Result   No acute intracranial abnormality.        No acute fracture or traumatic subluxation of the cervical spine.        Moderate cervical spondylotic degenerative change.        MACRO:   None        Signed by: Jaswant Jimenes 8/8/2024 1:21 AM   Dictation workstation:   [id number]      CT cervical spine wo IV contrast   Final Result   No acute intracranial abnormality.        No acute fracture or traumatic subluxation of the cervical spine.        Moderate cervical spondylotic degenerative change.        MACRO:   None        Signed by: Jaswant Jimenes 8/8/2024 1:21 AM   Dictation workstation:    [id number]      Holter Or Event Cardiac Monitor    (Results Pending)       Transthoracic Echo (TTE) Complete    Result Date: 8/8/2024          [place] [address]  Tel [phone] Fax [phone] TRANSTHORACIC ECHOCARDIOGRAM REPORT Patient Name:      ALEX SALDANA     Reading Physician:    [id number] Cedrick                                                               Jayme BRIDGES Study Date:        8/8/2024             Ordering Provider:    [id number] YE SANCHEZ MRN/PID:           [id number]             Fellow: Accession#:        [id number]         Nurse:                Christen Fisher Date of Birth/Age: 1955 / 68 years Sonographer:          Muriel Wang                                                               [facility] Gender:            F                    Additional Staff: Height:            149.86 cm            Admit Date:           8/7/2024 Weight:            60.33 kg             Admission Status:     Inpatient -                                                               Routine BSA / BMI:         1.55 m2 / 26.86      Department Location:  [place] [address]                                      Echo Lab Blood Pressure: 130 /81 mmHg Study Type:    TRANSTHORACIC ECHO (TTE) COMPLETE Diagnosis/ICD: Syncope and collapse-R55 Indication:    Syncope CPT Codes:     Echo Complete w Full Doppler-[id number] Patient History: Pertinent History: HTN and Dizziness. Study Detail: The following Echo studies were performed: 2D, M-Mode, Doppler and               color flow. Agitated saline used as a contrast agent for               intraseptal flow evaluation. The patient was awake.  PHYSICIAN INTERPRETATION: Left Ventricle: Left ventricular ejection fraction is normal, by visual estimate at 60-65%. There are no regional wall motion abnormalities. The left ventricular cavity size is normal. Spectral Doppler  shows an impaired relaxation pattern of left ventricular diastolic filling. LV Wall Scoring: All segments are normal. Left Atrium: The left atrium is normal in size. A bubble study using agitated saline was performed. Bubble study is negative. Right Ventricle: The right ventricle is normal in size. There is normal right ventricular global systolic function. Right Atrium: The right atrium is normal in size. Aortic Valve: The aortic valve appears structurally normal. The aortic valve appears tricuspid. There is no evidence of aortic valve stenosis. The aortic valve dimensionless index is 0.73. There is no evidence of aortic valve regurgitation. The peak instantaneous gradient of the aortic valve is 6.8 mmHg. The mean gradient of the aortic valve is 4.0 mmHg. Mitral Valve: The mitral valve is normal in structure. There is no evidence of mitral valve stenosis. There is normal mitral valve leaflet mobility. There is no evidence of mitral valve regurgitation. Tricuspid Valve: The tricuspid valve is structurally normal. There is normal tricuspid valve leaflet mobility. There is trace tricuspid regurgitation. Pulmonic Valve: The pulmonic valve is structurally normal. There is no indication of pulmonic valve regurgitation. Pericardium: There is a trivial pericardial effusion. Aorta: The aortic root is normal. Pulmonary Artery: The main pulmonary artery is normal in size, and position, with normal bifurcation into the left and right pulmonary arteries. Systemic Veins: The inferior vena cava appears to be of normal size. In comparison to the previous echocardiogram(s): Compared with study dated 12/28/2023, no significant change.  CONCLUSIONS:  1. Left ventricular ejection fraction is normal, by visual estimate at 60-65%.  2. No regional wall motion abnormalities.  3. Spectral Doppler shows an impaired relaxation pattern of left ventricular diastolic filling.  4. There is normal right ventricular global systolic function.  5.  There is no evidence of mitral valve stenosis.  6. No evidence of mitral valve regurgitation.  7. Trace tricuspid regurgitation is visualized.  8. Aortic valve stenosis is not present.  9. The main pulmonary artery is normal in size, and position, with normal bifurcation into the left and right pulmonary arteries. RECOMMENDATIONS: Transthoracic echo has low negative predictive value for mass, vegetation, or embolic source. Consider MASSIEL or MRI if clinically indicated.  QUANTITATIVE DATA SUMMARY: 2D MEASUREMENTS:                          Normal Ranges: Ao Root d:     2.73 cm   (2.0-3.7cm) LAs:           3.23 cm   (2.7-4.0cm) IVSd:          0.95 cm   (0.6-1.1cm) LVPWd:         0.88 cm   (0.6-1.1cm) LVIDd:         3.31 cm   (3.9-5.9cm) LVIDs:         2.17 cm LV Mass Index: 54.0 g/m2 LV % FS        34.4 % LA VOLUME:                               Normal Ranges: LA Vol A4C:        18.5 ml    (22+/-6mL/m2) LA Vol A2C:        16.8 ml LA Vol BP:         18.6 ml LA Vol Index A4C:  11.9ml/m2 LA Vol Index A2C:  10.9 ml/m2 LA Vol Index BP:   12.0 ml/m2 LA Area A4C:       9.3 cm2 LA Area A2C:       9.3 cm2 LA Major Axis A4C: 4.0 cm LA Major Axis A2C: 4.4 cm LA Volume Index:   11.2 ml/m2 RA VOLUME BY A/L METHOD:                               Normal Ranges: RA Vol A4C:        19.3 ml    (8.3-19.5ml) RA Vol Index A4C:  12.5 ml/m2 RA Area A4C:       9.7 cm2 RA Major Axis A4C: 4.1 cm AORTA MEASUREMENTS:                    Normal Ranges: Asc Ao, d: 2.92 cm (2.1-3.4cm) LV SYSTOLIC FUNCTION BY 2D PLANIMETRY (MOD):                      Normal Ranges: EF-A4C View:    66 % (>=55%) EF-A2C View:    68 % EF-Biplane:     66 % EF-Visual:      63 % LV EF Reported: 63 % LV DIASTOLIC FUNCTION:                         Normal Ranges: MV Peak E:    0.70 m/s  (0.7-1.2 m/s) MV Peak A:    1.08 m/s  (0.42-0.7 m/s) E/A Ratio:    0.65      (1.0-2.2) MV e'         0.092 m/s (>8.0) MV lateral e' 0.11 m/s MV medial e'  0.07 m/s E/e' Ratio:   7.59      (<8.0)  MITRAL VALVE:                 Normal Ranges: MV DT: 263 msec (150-240msec) AORTIC VALVE:                                   Normal Ranges: AoV Vmax:                1.30 m/s (<=1.7m/s) AoV Peak P.8 mmHg (<20mmHg) AoV Mean P.0 mmHg (1.7-11.5mmHg) LVOT Max Abel:            0.98 m/s (<=1.1m/s) AoV VTI:                 25.70 cm (18-25cm) LVOT VTI:                18.80 cm LVOT Diameter:           1.72 cm  (1.8-2.4cm) AoV Area, VTI:           1.70 cm2 (2.5-5.5cm2) AoV Area,Vmax:           1.76 cm2 (2.5-4.5cm2) AoV Dimensionless Index: 0.73  RIGHT VENTRICLE: RV Basal 2.65 cm RV Mid   2.06 cm RV Major 6.1 cm TAPSE:   15.0 mm RV s'    0.14 m/s TRICUSPID VALVE/RVSP:                             Normal Ranges: Peak TR Velocity: 1.74 m/s RV Syst Pressure: 15.1 mmHg (< 30mmHg) IVC Diam:         0.92 cm PULMONIC VALVE:                         Normal Ranges: PV Accel Time: 66 msec  (>120ms) PV Max Abel:    0.8 m/s  (0.6-0.9m/s) PV Max P.7 mmHg  Alfred Philip Jayme BRIDGES Electronically signed on 2024 at 10:58:59 AM  Wall Scoring  ** Final **      RADIOLOGY:     Transthoracic Echo (TTE) Complete   Final Result      CT chest abdomen pelvis wo IV contrast   Final Result   No significant acute process seen chest abdomen pelvis on noncontrast   imaging.        Incidental findings as above        MACRO:   None.        Signed by: Jaswant Jimenes 2024 1:39 AM   Dictation workstation:   [id number]      CT head wo IV contrast   Final Result   No acute intracranial abnormality.        No acute fracture or traumatic subluxation of the cervical spine.        Moderate cervical spondylotic degenerative change.        MACRO:   None        Signed by: Jaswant Jimenes 2024 1:21 AM   Dictation workstation:   [id number]      CT cervical spine wo IV contrast   Final Result   No acute intracranial abnormality.        No acute fracture or traumatic subluxation of the cervical spine.        Moderate cervical  spondylotic degenerative change.        MACRO:   None        Signed by: Jaswant Yudy 8/8/2024 1:21 AM   Dictation workstation:   [id number]      Holter Or Event Cardiac Monitor    (Results Pending)       PROBLEM LIST     Patient Active Problem List   Diagnosis    Alopecia of scalp    Depression with anxiety    GERD (gastroesophageal reflux disease)    History of alcohol abuse    History of hepatitis C virus infection    Hypertension, benign    Mild anemia    Osteopenia of both hips    Pyuria    Renal insufficiency    Schizoaffective disorder, bipolar type (Multi)    Overweight with body mass index (BMI) of 29 to 29.9 in adult    Acute renal failure, unspecified acute renal failure type (CMS-HCC)    Repeated falls    Syncope and collapse    Dizzy spells    Hypokalemia       ASSESSMENT:   68-year-old female seen evaluate the bedside in conjunction with Geri Deleon RN, CNP.    Bedside examination evaluation performed.    Chart reviewed in detail discussed with the staff.    Impression:    Report syncopal episodes, no clinical findings during hospitalization to suggest source of any syncopal episodes  Acute kidney injury on chronic kidney disease, improving with IV fluids being managed by primary service  Hypokalemia  Hyponatremia  Hypertension  Schizoaffective disorder, bipolar type with reported taking 20 mg twice daily of Abilify but per pharmacy dose is 10 mg daily    PLAN:     Resume antihypertensive medications when renal function allows  May discharge from cardiology perspective  48-hour Holter monitor and cardiology follow-up with me in the office in the near future    Cedrick Delacruz DO,FACC      Geri Deleon APRJESUS-CNP  [facility]    Of note, this documentation is completed using the Dragon Dictation system (voice recognition software). There may be spelling and/or grammatical errors that were not corrected prior to final submission.    Electronically signed  by Cedrick Delacruz DO on 8/9/2024 at 11:50 AM

## 2024-08-09 NOTE — PROGRESS NOTES
"Daily Progress Note    Shivani Neil is a 68 y.o. female on day 1 of admission presenting with Acute renal failure, unspecified acute renal failure type (CMS-HCC).    Subjective   Patient resting comfortably in bed after eating breakfast. Notes she was able to eat without n/v. Endorses dizziness and blurred vision. Denies CP, SOB, lower extremity edema. Patient continues to be able to urinate without difficulty, tends to be colorless. States she has noticed an increase in urinary frequency. Denies hematuria and urgency. Notably, Cr is downtrending, 1.92 today.       Objective   Physical Exam  Constitutional:       Appearance: Normal appearance.   HENT:      Head: Normocephalic.      Mouth/Throat:      Mouth: Mucous membranes are moist.   Eyes:      Conjunctiva/sclera: Conjunctivae normal.      Pupils: Pupils are equal, round, and reactive to light.   Cardiovascular:      Rate and Rhythm: Normal rate and regular rhythm.      Heart sounds: Normal heart sounds, S1 normal and S2 normal.   Pulmonary:      Effort: Pulmonary effort is normal.      Breath sounds: Normal breath sounds.   Abdominal:      General: Bowel sounds are normal.      Palpations: Abdomen is soft.      Tenderness: There is no abdominal tenderness.   Musculoskeletal:         General: Normal range of motion.      Cervical back: Neck supple.      Right lower leg: No edema.      Left lower leg: No edema.   Skin:     General: Skin is warm.   Neurological:      Mental Status: She is alert and oriented to person, place, and time.   Psychiatric:         Mood and Affect: Mood normal.         Behavior: Behavior normal.         Last Recorded Vitals  Blood pressure 159/75, pulse 79, temperature 36 °C (96.8 °F), resp. rate 19, height 1.499 m (4' 11\"), weight 60.7 kg (133 lb 13.1 oz), SpO2 97%.  Intake/Output last 3 Shifts:  I/O last 3 completed shifts:  In: 1000 (16.5 mL/kg) [I.V.:1000 (16.5 mL/kg)]  Out: - (0 mL/kg)   Weight: 60.7 kg     Medications  Scheduled " medications  ARIPiprazole, 10 mg, oral, Daily  deutetrabenazine, 9 mg, oral, BID  DULoxetine, 60 mg, oral, Daily  folic acid, 1 mg, oral, Daily  [Held by provider] hydroCHLOROthiazide, 25 mg, oral, Daily  [Held by provider] lisinopril, 10 mg, oral, Daily  multivitamin with minerals, 1 tablet, oral, Daily  pantoprazole, 40 mg, oral, Daily   Or  pantoprazole, 40 mg, intravenous, Daily  thiamine, 100 mg, oral, Daily      Continuous medications  lactated Ringer's, 100 mL/hr, Last Rate: 100 mL/hr (08/09/24 1121)      PRN medications  PRN medications: acetaminophen **OR** acetaminophen **OR** acetaminophen, melatonin, ondansetron **OR** ondansetron, polyethylene glycol    Labs  CBC:   Results from last 7 days   Lab Units 08/09/24 0927 08/08/24  0539 08/07/24  2340   WBC AUTO x10*3/uL 3.9* 4.8 5.0   RBC AUTO x10*6/uL 4.05 3.94* 4.70   HEMOGLOBIN g/dL 11.8* 11.6* 13.7   HEMATOCRIT % 35.7* 33.6* 40.5   MCV fL 88 85 86   MCH pg 29.1 29.4 29.1   MCHC g/dL 33.1 34.5 33.8   RDW % 12.8 12.6 12.7   PLATELETS AUTO x10*3/uL 255 273 318     CMP:    Results from last 7 days   Lab Units 08/09/24  0927 08/08/24  0539 08/07/24  2340   SODIUM mmol/L 136 130* 127*   POTASSIUM mmol/L 3.9 3.0* 3.3*   CHLORIDE mmol/L 103 96* 92*   CO2 mmol/L 27 22 22   BUN mg/dL 30* 49* 54*   CREATININE mg/dL 1.92* 3.15* 3.86*   GLUCOSE mg/dL 96 84 94   PROTEIN TOTAL g/dL  --   --  8.6*   CALCIUM mg/dL 9.5 9.1 9.8   BILIRUBIN TOTAL mg/dL  --   --  0.4   ALK PHOS U/L  --   --  82   AST U/L  --   --  18   ALT U/L  --   --  10     BMP:    Results from last 7 days   Lab Units 08/09/24  0927 08/08/24  0539 08/07/24  2340   SODIUM mmol/L 136 130* 127*   POTASSIUM mmol/L 3.9 3.0* 3.3*   CHLORIDE mmol/L 103 96* 92*   CO2 mmol/L 27 22 22   BUN mg/dL 30* 49* 54*   CREATININE mg/dL 1.92* 3.15* 3.86*   CALCIUM mg/dL 9.5 9.1 9.8   GLUCOSE mg/dL 96 84 94     Magnesium:  Results from last 7 days   Lab Units 08/09/24  0927 08/07/24  2340   MAGNESIUM mg/dL 1.63 2.15      Troponin:    Results from last 7 days   Lab Units 08/08/24  0040 08/07/24  2340   TROPHS ng/L 10 11     Relevant Results  Results from last 7 days   Lab Units 08/09/24  0927 08/08/24  0539 08/07/24  2340   GLUCOSE mg/dL 96 84 94       Assessment/Plan    Acute renal failure  -Urine culture without significant growth  -Continue lactated Ringer's  -Continue to monitor creatinine, today 1.92 - significantly improved  -Holding lisinopril and hydrochlorothiazide for now due to decreased kidney function  -Replace electrolytes as needed  -Nutrition consult, add gelatein plus to meals     Dizzy spells, possible etiology of polypharmacy  Syncope and collapse  Mechanical fall  -CT head without intracranial abnormality or fractures  -CT cervical spine no acute fracture  -CT chest abdomen pelvis without significant acute process  -TTE shows LVEF 60 to 65% without wall motion abnormalities  -Continue telemetry monitoring  -Cardiology consult, recommend orthostatic vitals, Holter monitor and follow-up outpatient  -Orthostatics negative yesterday  -PT/OT consult     Depression with anxiety  Schizoaffective disorder, bipolar type  History of alcohol abuse  -Continue thiamine and folic acid  -Continue Abilify 10 mg daily  -Resume home meds     GERD: Continue Protonix  HTN: Holding home meds for now due to kidney function, BP has been stable     DVTp: SCDs    Brina Medina PA-C    Plan of care was discussed extensively with patient.  Patient verbalized understanding through teach back method.  All question and concerns addressed upon examination.    Of note, this documentation is completed using the Dragon Dictation system (voice recognition software). There may be spelling and/or grammatical errors that were not corrected prior to final submission.

## 2024-08-09 NOTE — CARE PLAN
The patient's goals for the shift include      The clinical goals for the shift include safety    Over the shift, the patient did make progress toward the following goals.     Problem: Safety - Adult  Goal: Free from fall injury  Outcome: Progressing  Flowsheets (Taken 8/8/2024 2137)  Free from fall injury:   Instruct family/caregiver on patient safety   Based on caregiver fall risk screen, instruct family/caregiver to ask for assistance with transferring infant if caregiver noted to have fall risk factors     Problem: Chronic Conditions and Co-morbidities  Goal: Patient's chronic conditions and co-morbidity symptoms are monitored and maintained or improved  Outcome: Progressing  Flowsheets (Taken 8/8/2024 2137)  Care Plan - Patient's Chronic Conditions and Co-Morbidity Symptoms are Monitored and Maintained or Improved:   Monitor and assess patient's chronic conditions and comorbid symptoms for stability, deterioration, or improvement   Collaborate with multidisciplinary team to address chronic and comorbid conditions and prevent exacerbation or deterioration   Update acute care plan with appropriate goals if chronic or comorbid symptoms are exacerbated and prevent overall improvement and discharge

## 2024-08-09 NOTE — PROGRESS NOTES
Physical Therapy    Physical Therapy Treatment    Patient Name: Shivani Neil  MRN: [id number]  Today's Date: 8/9/2024  Time Calculation  Start Time: 1102  Stop Time: 1120  Time Calculation (min): 18 min     1103/1103-A    Assessment/Plan   Assessment Comment: Recommend continued therapy in acute care followed by continued therapy at a low intensity level following D/C.    End of Session Patient Position:  (Patient sitting up in chair after treatment. Call light/tray in reach. Chair alarm on.)    PT Plan  Inpatient/Swing Bed or Outpatient: Inpatient  Treatment/Interventions: Bed mobility, Transfer training, Gait training, Balance training, Strengthening, Endurance training, Therapeutic exercise, Stair training  PT Plan: Ongoing PT  PT Frequency: 2 times per week  PT Discharge Recommendations: Low intensity level of continued care  Equipment Recommended upon Discharge:  (TBD) PT Recommended Transfer Status: Contact guard    General Visit Information:   PT  Visit  PT Received On: 08/09/24    General  Prior to Session Communication:  (Cleared by RN for PT)  Patient Position Received:  (Patient in bed, agreeable to PT)    General Comment:  (Dx: syncope; acute renal failure)    General Observations:   General Observation:  (IV;  tele; alarm)    Precautions:  Precautions  Medical Precautions: Fall precautions    Pain:  Pain Assessment  Pain Assessment: 0-10  0-10 (Numeric) Pain Score: 0 - No pain    Cognition:  Cognition  Overall Cognitive Status:  (pleasant and cooperative; motivated with goals)    Balance:   Dynamic Standing Balance  Dynamic Standing-Balance:  (Fair dynamic stand without AD; Fair+ dynamic stand with AD)    Activity Tolerance:  Activity Tolerance  Endurance: Endurance does not limit participation in activity      Therapeutic Exercise  Therapeutic Exercise Performed:  (BLE ther-ex: AP, heel raises, LAQ, seated marching, hip add (pillow squeeze) x15)     Balance/Neuromuscular  "Re-Education  Balance/Neuromuscular Re-Education Activity Performed:  (Patient performed unsupported standing heel taps x5 each side and unsupported multilevel reaching ~2\" outside CANDE in stand.  No LOB with either activity.)    Bed Mobility  Bed Mobility:  (supine-->sit: Supervision  bed flat to mimic home set up.   Dizziness reported with change in position but resolved with increased time.)    Ambulation/Gait Training  Ambulation/Gait Training Performed:  (Patient declined to use ww for amb.  She amb 100' without any AD.  Very NBOS with right foot occasionally crossing midline. No arm swing, tends to hold herself very rigid. Switched to ww and amb an additional 10'.  Improved gait/balance with UE support.)    Transfers  Transfer:  (sit-->stand: SBAx1  Two stands performed (EOB; chair))        Outcome Measures:  [facility] Basic Mobility  Turning from your back to your side while in a flat bed without using bedrails: A little  Moving from lying on your back to sitting on the side of a flat bed without using bedrails: A little  Moving to and from bed to chair (including a wheelchair): A little  Standing up from a chair using your arms (e.g. wheelchair or bedside chair): A little  To walk in hospital room: A little  Climbing 3-5 steps with railing: A lot  Basic Mobility - Total Score: 17    Education Documentation  Mobility Training, taught by Aura Herron PTA at 8/9/2024 11:30 AM.  Learner: Patient  Readiness: Acceptance  Method: Explanation, Demonstration  Response: Needs Reinforcement    Education Comments  Individual(s) Educated: Patient  Education Provided:  (Recommend ww at this time until patient regains some of her strength.)    Encounter Problems       Encounter Problems (Active)       PT Problem       Pt. will transfer supine/sit with MOD I (Progressing)       Start:  08/08/24    Expected End:  08/22/24            Pt. will transfer sit/stand with FWW with MOD I (Progressing)       Start:  08/08/24    Expected " End:  08/22/24            Pt.will ambulate 50' with MOD I (Progressing)       Start:  08/08/24    Expected End:  08/22/24            Pt. will perform 2 x 15 B LE AROM exercises  (Progressing)       Start:  08/08/24    Expected End:  08/22/24               Pain - Adult

## 2024-08-09 NOTE — CARE PLAN
Problem: Pain - Adult  Goal: Verbalizes/displays adequate comfort level or baseline comfort level  Outcome: Progressing     Problem: Safety - Adult  Goal: Free from fall injury  Outcome: Progressing     Problem: Discharge Planning  Goal: Discharge to home or other facility with appropriate resources  Outcome: Progressing     Problem: Fall/Injury  Goal: Not fall by end of shift  Outcome: Progressing  Goal: Be free from injury by end of the shift  Outcome: Progressing  Goal: Verbalize understanding of personal risk factors for fall in the hospital  Outcome: Progressing  Goal: Verbalize understanding of risk factor reduction measures to prevent injury from fall in the home  Outcome: Progressing  Goal: Use assistive devices by end of the shift  Outcome: Progressing  Goal: Pace activities to prevent fatigue by end of the shift  Outcome: Progressing   The patient's goals for the shift include      The clinical goals for the shift include monitor vs, labs, I&O's; manage pain; promote safe ambulation with assistance;

## 2024-08-10 VITALS
HEIGHT: 59 IN | SYSTOLIC BLOOD PRESSURE: 157 MMHG | OXYGEN SATURATION: 99 % | DIASTOLIC BLOOD PRESSURE: 76 MMHG | RESPIRATION RATE: 16 BRPM | BODY MASS INDEX: 26.98 KG/M2 | WEIGHT: 133.82 LBS | TEMPERATURE: 97 F | HEART RATE: 80 BPM

## 2024-08-10 LAB
ANION GAP SERPL CALC-SCNC: 9 MMOL/L (ref 10–20)
BUN SERPL-MCNC: 30 MG/DL (ref 6–23)
CALCIUM SERPL-MCNC: 9.1 MG/DL (ref 8.6–10.3)
CHLORIDE SERPL-SCNC: 103 MMOL/L (ref 98–107)
CO2 SERPL-SCNC: 29 MMOL/L (ref 21–32)
CREAT SERPL-MCNC: 1.55 MG/DL (ref 0.5–1.05)
EGFRCR SERPLBLD CKD-EPI 2021: 37 ML/MIN/1.73M*2
ERYTHROCYTE [DISTWIDTH] IN BLOOD BY AUTOMATED COUNT: 13 % (ref 11.5–14.5)
GLUCOSE SERPL-MCNC: 79 MG/DL (ref 74–99)
HCT VFR BLD AUTO: 33.2 % (ref 36–46)
HGB BLD-MCNC: 10.9 G/DL (ref 12–16)
HOLD SPECIMEN: NORMAL
LACTATE SERPL-SCNC: 1.9 MMOL/L (ref 0.4–2)
MCH RBC QN AUTO: 29.2 PG (ref 26–34)
MCHC RBC AUTO-ENTMCNC: 32.8 G/DL (ref 32–36)
MCV RBC AUTO: 89 FL (ref 80–100)
NRBC BLD-RTO: 0 /100 WBCS (ref 0–0)
PLATELET # BLD AUTO: 224 X10*3/UL (ref 150–450)
POTASSIUM SERPL-SCNC: 4.2 MMOL/L (ref 3.5–5.3)
RBC # BLD AUTO: 3.73 X10*6/UL (ref 4–5.2)
SODIUM SERPL-SCNC: 137 MMOL/L (ref 136–145)
WBC # BLD AUTO: 4.9 X10*3/UL (ref 4.4–11.3)

## 2024-08-10 PROCEDURE — 83605 ASSAY OF LACTIC ACID: CPT | Performed by: INTERNAL MEDICINE

## 2024-08-10 PROCEDURE — 2500000001 HC RX 250 WO HCPCS SELF ADMINISTERED DRUGS (ALT 637 FOR MEDICARE OP): Performed by: HOSPITALIST

## 2024-08-10 PROCEDURE — 2500000001 HC RX 250 WO HCPCS SELF ADMINISTERED DRUGS (ALT 637 FOR MEDICARE OP)

## 2024-08-10 PROCEDURE — 99239 HOSP IP/OBS DSCHRG MGMT >30: CPT | Performed by: HOSPITALIST

## 2024-08-10 PROCEDURE — 2500000001 HC RX 250 WO HCPCS SELF ADMINISTERED DRUGS (ALT 637 FOR MEDICARE OP): Performed by: INTERNAL MEDICINE

## 2024-08-10 PROCEDURE — 36415 COLL VENOUS BLD VENIPUNCTURE: CPT | Performed by: INTERNAL MEDICINE

## 2024-08-10 PROCEDURE — 36415 COLL VENOUS BLD VENIPUNCTURE: CPT

## 2024-08-10 PROCEDURE — 80048 BASIC METABOLIC PNL TOTAL CA: CPT

## 2024-08-10 PROCEDURE — 85027 COMPLETE CBC AUTOMATED: CPT

## 2024-08-10 ASSESSMENT — COGNITIVE AND FUNCTIONAL STATUS - GENERAL
STANDING UP FROM CHAIR USING ARMS: A LITTLE
HELP NEEDED FOR BATHING: A LITTLE
MOBILITY SCORE: 20
DAILY ACTIVITIY SCORE: 21
CLIMB 3 TO 5 STEPS WITH RAILING: A LOT
DRESSING REGULAR LOWER BODY CLOTHING: A LITTLE
MOVING TO AND FROM BED TO CHAIR: A LITTLE
DRESSING REGULAR UPPER BODY CLOTHING: A LITTLE

## 2024-08-10 ASSESSMENT — PAIN - FUNCTIONAL ASSESSMENT: PAIN_FUNCTIONAL_ASSESSMENT: 0-10

## 2024-08-10 ASSESSMENT — PAIN SCALES - GENERAL: PAINLEVEL_OUTOF10: 0 - NO PAIN

## 2024-08-10 NOTE — CARE PLAN
The patient's goals for the shift include Labs WNL    The clinical goals for the shift include Labs WNL      Problem: Pain - Adult  Goal: Verbalizes/displays adequate comfort level or baseline comfort level  Outcome: Progressing     Problem: Safety - Adult  Goal: Free from fall injury  Outcome: Progressing     Problem: Discharge Planning  Goal: Discharge to home or other facility with appropriate resources  Outcome: Progressing     Problem: Chronic Conditions and Co-morbidities  Goal: Patient's chronic conditions and co-morbidity symptoms are monitored and maintained or improved  Outcome: Progressing     Problem: Fall/Injury  Goal: Not fall by end of shift  Outcome: Progressing  Goal: Be free from injury by end of the shift  Outcome: Progressing  Goal: Verbalize understanding of personal risk factors for fall in the hospital  Outcome: Progressing  Goal: Verbalize understanding of risk factor reduction measures to prevent injury from fall in the home  Outcome: Progressing  Goal: Use assistive devices by end of the shift  Outcome: Progressing  Goal: Pace activities to prevent fatigue by end of the shift  Outcome: Progressing     Problem: Skin  Goal: Participates in plan/prevention/treatment measures  8/10/2024 0609 by Annalisa Sr RN  Outcome: Progressing  8/9/2024 2103 by Annalisa Sr RN  Flowsheets (Taken 8/9/2024 2103)  Participates in plan/prevention/treatment measures:   Increase activity/out of bed for meals   Discuss with provider PT/OT consult   Elevate heels  Goal: Prevent/manage excess moisture  8/10/2024 0609 by Annalisa Sr RN  Outcome: Progressing  8/9/2024 2103 by Annalisa Sr RN  Flowsheets (Taken 8/9/2024 2103)  Prevent/manage excess moisture:   Use wicking fabric (obtain order)   Moisturize dry skin   Cleanse incontinence/protect with barrier cream   Monitor for/manage infection if present   Follow provider orders for dressing changes  Goal: Prevent/minimize sheer/friction  injuries  8/10/2024 0609 by Annalisa Sr RN  Outcome: Progressing  8/9/2024 2103 by Annalisa Sr RN  Flowsheets (Taken 8/9/2024 2103)  Prevent/minimize sheer/friction injuries:   Utilize specialty bed per algorithm   Use pull sheet   Turn/reposition every 2 hours/use positioning/transfer devices   Increase activity/out of bed for meals   HOB 30 degrees or less   Complete micro-shifts as needed if patient unable. Adjust patient position to relieve pressure points, not a full turn  Goal: Promote/optimize nutrition  8/10/2024 0609 by Annalisa Sr RN  Outcome: Progressing  8/9/2024 2103 by Annalisa Sr RN  Flowsheets (Taken 8/9/2024 2103)  Promote/optimize nutrition:   Offer water/supplements/favorite foods   Discuss with provider if NPO > 2 days   Assist with feeding   Reassess MST if dietician not consulted   Consume > 50% meals/supplements   Monitor/record intake including meals  Goal: Promote skin healing  8/10/2024 0609 by Annalisa Sr RN  Outcome: Progressing  8/9/2024 2103 by Annalisa Sr RN  Flowsheets (Taken 8/9/2024 2103)  Promote skin healing:   Turn/reposition every 2 hours/use positioning/transfer devices   Rotate device position/do not position patient on device   Protective dressings over bony prominences   Ensure correct size (line/device) and apply per  instructions   Assess skin/pad under line(s)/device(s)

## 2024-08-10 NOTE — PROGRESS NOTES
08/10/24 1216   Current Planned Discharge Disposition   Current Planned Discharge Disposition Home     Patient being discharged to home today with [facility]. JCAKIE Castillo

## 2024-08-10 NOTE — DISCHARGE SUMMARY
Discharge Diagnosis  Acute renal failure, unspecified acute renal failure type (CMS-HCC)    Issues Requiring Follow-Up  Follow-up with cardiology   Follow-up with primary care provider     Discharge Meds     Your medication list        CHANGE how you take these medications        Instructions Last Dose Given Next Dose Due   ARIPiprazole 10 mg tablet  Commonly known as: Abilify      Take 1 tablet (10 mg) by mouth once daily.       DULoxetine 60 mg DR capsule  Commonly known as: Cymbalta  What changed: how much to take      Take 1 capsule (60 mg) by mouth once daily.              CONTINUE taking these medications        Instructions Last Dose Given Next Dose Due   Austedo 12 mg tablet  Generic drug: deutetrabenazine           buPROPion  mg 24 hr tablet  Commonly known as: Wellbutrin XL           haloperidol 10 mg tablet  Commonly known as: Haldol           ibandronate 150 mg tablet  Commonly known as: Boniva      Take 1 tablet (150 mg) by mouth every 30 (thirty) days. take with 8 to 10 ounces of water. Stay upright and do not eat or drink for 1 hour       lisinopril 10 mg tablet      Take 1 tablet (10 mg) by mouth once daily.       naloxone 4 mg/0.1 mL nasal spray  Commonly known as: Narcan           omeprazole 40 mg DR capsule  Commonly known as: PriLOSEC      TAKE 1 CAPSULE BY MOUTH ONCE DAILY              STOP taking these medications      hydroCHLOROthiazide 25 mg tablet  Commonly known as: HYDRODiuril                 Test Results Pending At Discharge  Pending Labs       Order Current Status    Extra Urine Gray Tube Collected (08/07/24 6727)    Urinalysis with Reflex Culture and Microscopic In process            Hospital Course  Patient is a 69 year old female admitted for history of multiple syncopal episodes possibly secondary to polypharmacy and acute renal failure secondary to dehydration.     Acute renal failure  Essential hypertension    Patient has improved significantly with fluids and holding  medications. Patient should continue to hold hydrochlorothiazide. Continue taking lisinopril. Patient will follow-up with cardiology for further management.       Dizzy spells, possible etiology of polypharmacy  Syncope and collapse  Mechanical fall  Patient has improved with holding medications. Patient has not had a syncopal episodes for duration of admission. Patient will need to follow-up with cardiology for 48-hour Holter monitor for syncope work-up.     Depression with anxiety  Schizoaffective disorder, bipolar type  History of alcohol abuse  Patient reported taking Abilify 20 mg twice daily. However, Abilify was prescribed as 10 mg once a day per pharmacy team. Patient tolerated resuming home meds well. Continue home medications upon discharge. Patient should follow-up with primary care provider for further polypharmacy management.        GERD  Continue Protonix     Discharged home [facility] condition with [facility]. Greater than 30 minutes of clinical time spent caring for this patient.    Pertinent Physical Exam At Time of Discharge    General: Alert and oriented x 3, no distress  Cardiovascular: Normal S1-S2, sinus rhythm, no murmurs  Respiratory: Good air entry bilaterally, no obvious wheeze or crackles  Abdomen: Abdomen is soft, not distended with no tenderness  Extremities: No edema or deformities  Neurology: Alert and oriented x 3, no acute focal deficits     Outpatient Follow-Up  Future Appointments   Date Time Provider Department Center   8/16/2024  9:00 AM RACHAEL EDWARDS ECG/HOLTER [id number] [facility]   8/26/2024  1:00 PM Cedrick Delacruz DO [id number] [facility]   8/27/2024  2:00 PM Cooper Woodson MD [id number] [facility]

## 2024-08-10 NOTE — CARE PLAN
The patient's goals for the shift include no pain    The clinical goals for the shift include patient will remain free from fall/injury throughout end of shift    Over the shift, the patient did not make progress toward the following goals. Barriers to progression include; none. Recommendations to address these barriers include; continue current plan of care  Problem: Skin  Goal: Participates in plan/prevention/treatment measures  Outcome: Progressing  Flowsheets (Taken 8/10/2024 0948)  Participates in plan/prevention/treatment measures:   Elevate heels   Increase activity/out of bed for meals     Problem: Skin  Goal: Prevent/minimize sheer/friction injuries  Outcome: Progressing  Flowsheets (Taken 8/10/2024 0948)  Prevent/minimize sheer/friction injuries:   Complete micro-shifts as needed if patient unable. Adjust patient position to relieve pressure points, not a full turn   Increase activity/out of bed for meals   Use pull sheet   HOB 30 degrees or less   .

## 2024-08-12 ENCOUNTER — PATIENT OUTREACH (OUTPATIENT)
Dept: CARE COORDINATION | Age: 69
End: 2024-08-12
Payer: MEDICARE

## 2024-08-12 SDOH — SOCIAL STABILITY: SOCIAL NETWORK: HOW OFTEN DO YOU GET TOGETHER WITH FRIENDS OR RELATIVES?: ONCE A WEEK

## 2024-08-12 SDOH — SOCIAL STABILITY: SOCIAL INSECURITY
WITHIN THE LAST YEAR, HAVE YOU BEEN KICKED, HIT, SLAPPED, OR OTHERWISE PHYSICALLY HURT BY YOUR PARTNER OR EX-PARTNER?: NO

## 2024-08-12 SDOH — ECONOMIC STABILITY: TRANSPORTATION INSECURITY
IN THE PAST 12 MONTHS, HAS THE LACK OF TRANSPORTATION KEPT YOU FROM MEDICAL APPOINTMENTS OR FROM GETTING MEDICATIONS?: NO

## 2024-08-12 SDOH — SOCIAL STABILITY: SOCIAL NETWORK: IN A TYPICAL WEEK, HOW MANY TIMES DO YOU TALK ON THE PHONE WITH FAMILY, FRIENDS, OR NEIGHBORS?: ONCE A WEEK

## 2024-08-12 SDOH — HEALTH STABILITY: MENTAL HEALTH: HOW OFTEN DO YOU HAVE 6 OR MORE DRINKS ON ONE OCCASION?: NEVER

## 2024-08-12 SDOH — ECONOMIC STABILITY: TRANSPORTATION INSECURITY
IN THE PAST 12 MONTHS, HAS LACK OF TRANSPORTATION KEPT YOU FROM MEETINGS, WORK, OR FROM GETTING THINGS NEEDED FOR DAILY LIVING?: NO

## 2024-08-12 SDOH — ECONOMIC STABILITY: FOOD INSECURITY: WITHIN THE PAST 12 MONTHS, YOU WORRIED THAT YOUR FOOD WOULD RUN OUT BEFORE YOU GOT MONEY TO BUY MORE.: NEVER TRUE

## 2024-08-12 SDOH — HEALTH STABILITY: MENTAL HEALTH
HOW OFTEN DO YOU NEED TO HAVE SOMEONE HELP YOU WHEN YOU READ INSTRUCTIONS, PAMPHLETS, OR OTHER WRITTEN MATERIAL FROM YOUR DOCTOR OR PHARMACY?: NEVER

## 2024-08-12 SDOH — ECONOMIC STABILITY: INCOME INSECURITY: IN THE PAST 12 MONTHS, HAS THE ELECTRIC, GAS, OIL, OR WATER COMPANY THREATENED TO SHUT OFF SERVICE IN YOUR HOME?: NO

## 2024-08-12 SDOH — SOCIAL STABILITY: SOCIAL INSECURITY: WITHIN THE LAST YEAR, HAVE YOU BEEN HUMILIATED OR EMOTIONALLY ABUSED IN OTHER WAYS BY YOUR PARTNER OR EX-PARTNER?: NO

## 2024-08-12 SDOH — HEALTH STABILITY: MENTAL HEALTH: HOW MANY STANDARD DRINKS CONTAINING ALCOHOL DO YOU HAVE ON A TYPICAL DAY?: PATIENT DOES NOT DRINK

## 2024-08-12 SDOH — SOCIAL STABILITY: SOCIAL NETWORK: HOW OFTEN DO YOU ATTEND CHURCH OR RELIGIOUS SERVICES?: NEVER

## 2024-08-12 SDOH — SOCIAL STABILITY: SOCIAL NETWORK
DO YOU BELONG TO ANY CLUBS OR ORGANIZATIONS SUCH AS CHURCH GROUPS UNIONS, FRATERNAL OR ATHLETIC GROUPS, OR SCHOOL GROUPS?: NO

## 2024-08-12 SDOH — SOCIAL STABILITY: SOCIAL INSECURITY: WITHIN THE LAST YEAR, HAVE YOU BEEN AFRAID OF YOUR PARTNER OR EX-PARTNER?: NO

## 2024-08-12 SDOH — HEALTH STABILITY: PHYSICAL HEALTH: ON AVERAGE, HOW MANY MINUTES DO YOU ENGAGE IN EXERCISE AT THIS LEVEL?: 0 MIN

## 2024-08-12 SDOH — ECONOMIC STABILITY: FOOD INSECURITY: WITHIN THE PAST 12 MONTHS, THE FOOD YOU BOUGHT JUST DIDN'T LAST AND YOU DIDN'T HAVE MONEY TO GET MORE.: SOMETIMES TRUE

## 2024-08-12 SDOH — ECONOMIC STABILITY: HOUSING INSECURITY: AT ANY TIME IN THE PAST 12 MONTHS, WERE YOU HOMELESS OR LIVING IN A SHELTER (INCLUDING NOW)?: NO

## 2024-08-12 SDOH — SOCIAL STABILITY: SOCIAL INSECURITY
WITHIN THE LAST YEAR, HAVE TO BEEN RAPED OR FORCED TO HAVE ANY KIND OF SEXUAL ACTIVITY BY YOUR PARTNER OR EX-PARTNER?: NO

## 2024-08-12 SDOH — SOCIAL STABILITY: SOCIAL NETWORK: ARE YOU MARRIED, WIDOWED, DIVORCED, SEPARATED, NEVER MARRIED, OR LIVING WITH A PARTNER?: DIVORCED

## 2024-08-12 SDOH — HEALTH STABILITY: MENTAL HEALTH: HOW OFTEN DO YOU HAVE A DRINK CONTAINING ALCOHOL?: NEVER

## 2024-08-12 SDOH — HEALTH STABILITY: MENTAL HEALTH
STRESS IS WHEN SOMEONE FEELS TENSE, NERVOUS, ANXIOUS, OR CAN'T SLEEP AT NIGHT BECAUSE THEIR MIND IS TROUBLED. HOW STRESSED ARE YOU?: RATHER MUCH

## 2024-08-12 SDOH — SOCIAL STABILITY: SOCIAL NETWORK: HOW OFTEN DO YOU ATTENT MEETINGS OF THE CLUB OR ORGANIZATION YOU BELONG TO?: NEVER

## 2024-08-12 SDOH — HEALTH STABILITY: PHYSICAL HEALTH: ON AVERAGE, HOW MANY DAYS PER WEEK DO YOU ENGAGE IN MODERATE TO STRENUOUS EXERCISE (LIKE A BRISK WALK)?: 0 DAYS

## 2024-08-12 ASSESSMENT — LIFESTYLE VARIABLES
AUDIT-C TOTAL SCORE: 0
SKIP TO QUESTIONS 9-10: 1

## 2024-08-12 NOTE — PROGRESS NOTES
Daily Call Note:   Pt denies any symptoms today, enrolled in the H@H program today. All pt questions answered. Scheduled for a Holter Monitor on 8/16 she stated.     Topics for Daily Review: Enrollment today  Pt demonstrates clear understanding: Yes    VS on 8/8/24:  /70; HR 75; 99% .     Last 3 Weights:  Wt Readings from Last 7 Encounters:   08/08/24 60.7 kg (133 lb 13.1 oz)   12/12/23 68.6 kg (151 lb 3.2 oz)   10/02/23 68.8 kg (151 lb 9.6 oz)   09/21/22 64.9 kg (143 lb 2 oz)   08/24/22 63.7 kg (140 lb 6 oz)       Masimo Device: No       Virtual Visits--Scheduled (Most Recent Date at Top)  Follow up Appointments  Recent Visits  No visits were found meeting these conditions.  Showing recent visits within past 30 days and meeting all other requirements  Future Appointments  Date Type Provider Dept   08/27/24 Appointment Cooper Woodson MD [id number]   Showing future appointments within next 90 days and meeting all other requirements       Frequency of RN Calls & Virtual Visits per Team Agreement: Healthy at Home Frequency: Daily    Medication issues Addressed (what was done):     Follow up appointments scheduled by [facility] Staff: [facility] INITIAL 8/13 @1000  Referrals made by [facility] staff:

## 2024-08-13 ENCOUNTER — PATIENT OUTREACH (OUTPATIENT)
Dept: CARE COORDINATION | Facility: CLINIC | Age: 69
End: 2024-08-13

## 2024-08-13 ENCOUNTER — PATIENT OUTREACH (OUTPATIENT)
Dept: HOME HEALTH SERVICES | Age: 69
End: 2024-08-13

## 2024-08-13 PROBLEM — R29.6 REPEATED FALLS: Status: RESOLVED | Noted: 2024-08-08 | Resolved: 2024-08-13

## 2024-08-13 PROBLEM — N28.9 RENAL INSUFFICIENCY: Status: RESOLVED | Noted: 2023-03-02 | Resolved: 2024-08-13

## 2024-08-13 PROBLEM — N17.9 ACUTE RENAL FAILURE, UNSPECIFIED ACUTE RENAL FAILURE TYPE (CMS-HCC): Status: RESOLVED | Noted: 2024-08-08 | Resolved: 2024-08-13

## 2024-08-13 PROBLEM — R82.81 PYURIA: Status: RESOLVED | Noted: 2023-03-02 | Resolved: 2024-08-13

## 2024-08-13 PROBLEM — E87.6 HYPOKALEMIA: Status: RESOLVED | Noted: 2024-08-08 | Resolved: 2024-08-13

## 2024-08-13 PROBLEM — R55 SYNCOPE AND COLLAPSE: Status: RESOLVED | Noted: 2024-08-08 | Resolved: 2024-08-13

## 2024-08-13 PROBLEM — R42 DIZZY SPELLS: Status: RESOLVED | Noted: 2024-08-08 | Resolved: 2024-08-13

## 2024-08-13 NOTE — PROGRESS NOTES
Discharge Facility: [facility]   Discharge Diagnosis: Acute renal failure, unspecified acute renal failure type   Admission Date: 8/7/24  Discharge Date: 8/10/24    PCP Appointment Date: Cooper Woodson MD 8/27/24 TBD/tasked to office                  Specialist Appointment Date: Cardiology Dr Delacruz 8/26/24  Hospital Encounter and Summary Linked: Yes    Two attempts were made to reach patient within two business days after discharge. Voicemail left with contact information for patient to call back with any non-emergent questions or concerns.

## 2024-08-14 ENCOUNTER — PATIENT OUTREACH (OUTPATIENT)
Dept: HOME HEALTH SERVICES | Age: 69
End: 2024-08-14
Payer: MEDICARE

## 2024-08-15 ENCOUNTER — PATIENT OUTREACH (OUTPATIENT)
Dept: CARE COORDINATION | Age: 69
End: 2024-08-15
Payer: MEDICARE

## 2024-08-15 NOTE — PROGRESS NOTES
Daily Call Note:   LVM.           Last 3 Weights:  Wt Readings from Last 7 Encounters:   08/08/24 60.7 kg (133 lb 13.1 oz)   12/12/23 68.6 kg (151 lb 3.2 oz)   10/02/23 68.8 kg (151 lb 9.6 oz)   09/21/22 64.9 kg (143 lb 2 oz)   08/24/22 63.7 kg (140 lb 6 oz)       Masimo Device: No       Virtual Visits--Scheduled (Most Recent Date at Top)  Follow up Appointments  Recent Visits  No visits were found meeting these conditions.  Showing recent visits within past 30 days and meeting all other requirements  Future Appointments  Date Type Provider Dept   08/27/24 Appointment Cooper Woodson MD [id number]   Showing future appointments within next 90 days and meeting all other requirements

## 2024-08-16 ENCOUNTER — APPOINTMENT (OUTPATIENT)
Dept: CARDIOLOGY | Facility: CLINIC | Age: 69
End: 2024-08-16
Payer: MEDICARE

## 2024-08-16 DIAGNOSIS — R55 SYNCOPE AND COLLAPSE: ICD-10-CM

## 2024-08-16 PROCEDURE — 93227 XTRNL ECG REC<48 HR R&I: CPT | Performed by: INTERNAL MEDICINE

## 2024-08-16 PROCEDURE — 93225 XTRNL ECG REC<48 HRS REC: CPT | Performed by: INTERNAL MEDICINE

## 2024-08-22 ENCOUNTER — APPOINTMENT (OUTPATIENT)
Dept: CARDIOLOGY | Facility: CLINIC | Age: 69
End: 2024-08-22
Payer: MEDICARE

## 2024-08-22 DIAGNOSIS — K21.9 GASTROESOPHAGEAL REFLUX DISEASE, UNSPECIFIED WHETHER ESOPHAGITIS PRESENT: ICD-10-CM

## 2024-08-22 DIAGNOSIS — I10 HYPERTENSION, BENIGN: ICD-10-CM

## 2024-08-22 RX ORDER — LISINOPRIL 10 MG/1
10 TABLET ORAL DAILY
Qty: 90 TABLET | Refills: 3 | Status: SHIPPED | OUTPATIENT
Start: 2024-08-22

## 2024-08-22 RX ORDER — OMEPRAZOLE 40 MG/1
40 CAPSULE, DELAYED RELEASE ORAL DAILY
Qty: 90 CAPSULE | Refills: 1 | Status: SHIPPED | OUTPATIENT
Start: 2024-08-22

## 2024-08-22 NOTE — TELEPHONE ENCOUNTER
Rx Refill Request     Name: Shivani Neil  :  1955   Medication Name:  LISINIPRIL 10MG     OEMPROZOLE 40MG  Specific Pharmacy location:  [facility]  Date of last appointment:  2023   Date of next appointment:  2024   Best number to reach patient:  [phone]

## 2024-08-26 ENCOUNTER — APPOINTMENT (OUTPATIENT)
Dept: CARDIOLOGY | Facility: CLINIC | Age: 69
End: 2024-08-26
Payer: MEDICARE

## 2024-08-27 ENCOUNTER — LAB (OUTPATIENT)
Dept: LAB | Facility: LAB | Age: 69
End: 2024-08-27
Payer: MEDICARE

## 2024-08-27 ENCOUNTER — APPOINTMENT (OUTPATIENT)
Dept: PRIMARY CARE | Facility: CLINIC | Age: 69
End: 2024-08-27
Payer: MEDICARE

## 2024-08-27 VITALS
OXYGEN SATURATION: 100 % | SYSTOLIC BLOOD PRESSURE: 154 MMHG | HEIGHT: 59 IN | BODY MASS INDEX: 28.22 KG/M2 | RESPIRATION RATE: 16 BRPM | HEART RATE: 67 BPM | WEIGHT: 140 LBS | DIASTOLIC BLOOD PRESSURE: 82 MMHG | TEMPERATURE: 97.2 F

## 2024-08-27 DIAGNOSIS — F41.8 DEPRESSION WITH ANXIETY: ICD-10-CM

## 2024-08-27 DIAGNOSIS — I10 HYPERTENSION, BENIGN: ICD-10-CM

## 2024-08-27 DIAGNOSIS — N18.32 STAGE 3B CHRONIC KIDNEY DISEASE (MULTI): ICD-10-CM

## 2024-08-27 DIAGNOSIS — Q61.3 POLYCYSTIC KIDNEY DISEASE: ICD-10-CM

## 2024-08-27 DIAGNOSIS — Z78.0 ASYMPTOMATIC MENOPAUSAL STATE: ICD-10-CM

## 2024-08-27 DIAGNOSIS — Z00.00 ROUTINE GENERAL MEDICAL EXAMINATION AT HEALTH CARE FACILITY: Primary | ICD-10-CM

## 2024-08-27 DIAGNOSIS — Z12.31 ENCOUNTER FOR SCREENING MAMMOGRAM FOR MALIGNANT NEOPLASM OF BREAST: ICD-10-CM

## 2024-08-27 LAB
ALBUMIN SERPL BCP-MCNC: 4.2 G/DL (ref 3.4–5)
ALP SERPL-CCNC: 74 U/L (ref 33–136)
ALT SERPL W P-5'-P-CCNC: 16 U/L (ref 7–45)
ANION GAP SERPL CALC-SCNC: 11 MMOL/L
AST SERPL W P-5'-P-CCNC: 20 U/L (ref 9–39)
BASOPHILS # BLD AUTO: 0.04 X10*3/UL (ref 0–0.1)
BASOPHILS NFR BLD AUTO: 0.7 %
BILIRUB SERPL-MCNC: 0.4 MG/DL (ref 0–1.2)
BUN SERPL-MCNC: 15 MG/DL (ref 6–23)
CALCIUM SERPL-MCNC: 9.3 MG/DL (ref 8.6–10.3)
CHLORIDE SERPL-SCNC: 98 MMOL/L (ref 98–107)
CO2 SERPL-SCNC: 23 MMOL/L (ref 21–32)
CREAT SERPL-MCNC: 1.35 MG/DL (ref 0.5–1.05)
EGFRCR SERPLBLD CKD-EPI 2021: 43 ML/MIN/1.73M*2
EOSINOPHIL # BLD AUTO: 0.12 X10*3/UL (ref 0–0.7)
EOSINOPHIL NFR BLD AUTO: 2 %
ERYTHROCYTE [DISTWIDTH] IN BLOOD BY AUTOMATED COUNT: 13.4 % (ref 11.5–14.5)
GLUCOSE SERPL-MCNC: 79 MG/DL (ref 74–99)
HCT VFR BLD AUTO: 37.4 % (ref 36–46)
HGB BLD-MCNC: 11.9 G/DL (ref 12–16)
IMM GRANULOCYTES # BLD AUTO: 0.02 X10*3/UL (ref 0–0.7)
IMM GRANULOCYTES NFR BLD AUTO: 0.3 % (ref 0–0.9)
LYMPHOCYTES # BLD AUTO: 2.51 X10*3/UL (ref 1.2–4.8)
LYMPHOCYTES NFR BLD AUTO: 41.3 %
MCH RBC QN AUTO: 29 PG (ref 26–34)
MCHC RBC AUTO-ENTMCNC: 31.8 G/DL (ref 32–36)
MCV RBC AUTO: 91 FL (ref 80–100)
MONOCYTES # BLD AUTO: 0.5 X10*3/UL (ref 0.1–1)
MONOCYTES NFR BLD AUTO: 8.2 %
NEUTROPHILS # BLD AUTO: 2.89 X10*3/UL (ref 1.2–7.7)
NEUTROPHILS NFR BLD AUTO: 47.5 %
NRBC BLD-RTO: 0 /100 WBCS (ref 0–0)
PLATELET # BLD AUTO: 306 X10*3/UL (ref 150–450)
POTASSIUM SERPL-SCNC: 4.7 MMOL/L (ref 3.5–5.3)
PROT SERPL-MCNC: 7.9 G/DL (ref 6.4–8.2)
RBC # BLD AUTO: 4.1 X10*6/UL (ref 4–5.2)
SODIUM SERPL-SCNC: 127 MMOL/L (ref 136–145)
WBC # BLD AUTO: 6.1 X10*3/UL (ref 4.4–11.3)

## 2024-08-27 PROCEDURE — 36415 COLL VENOUS BLD VENIPUNCTURE: CPT

## 2024-08-27 PROCEDURE — 1036F TOBACCO NON-USER: CPT | Performed by: FAMILY MEDICINE

## 2024-08-27 PROCEDURE — 3008F BODY MASS INDEX DOCD: CPT | Performed by: FAMILY MEDICINE

## 2024-08-27 PROCEDURE — 3077F SYST BP >= 140 MM HG: CPT | Performed by: FAMILY MEDICINE

## 2024-08-27 PROCEDURE — 1111F DSCHRG MED/CURRENT MED MERGE: CPT | Performed by: FAMILY MEDICINE

## 2024-08-27 PROCEDURE — 1158F ADVNC CARE PLAN TLK DOCD: CPT | Performed by: FAMILY MEDICINE

## 2024-08-27 PROCEDURE — 80053 COMPREHEN METABOLIC PANEL: CPT

## 2024-08-27 PROCEDURE — 1170F FXNL STATUS ASSESSED: CPT | Performed by: FAMILY MEDICINE

## 2024-08-27 PROCEDURE — 3079F DIAST BP 80-89 MM HG: CPT | Performed by: FAMILY MEDICINE

## 2024-08-27 PROCEDURE — 85025 COMPLETE CBC W/AUTO DIFF WBC: CPT

## 2024-08-27 PROCEDURE — 1123F ACP DISCUSS/DSCN MKR DOCD: CPT | Performed by: FAMILY MEDICINE

## 2024-08-27 PROCEDURE — G0439 PPPS, SUBSEQ VISIT: HCPCS | Performed by: FAMILY MEDICINE

## 2024-08-27 PROCEDURE — 99214 OFFICE O/P EST MOD 30 MIN: CPT | Performed by: FAMILY MEDICINE

## 2024-08-27 PROCEDURE — 1159F MED LIST DOCD IN RCRD: CPT | Performed by: FAMILY MEDICINE

## 2024-08-27 RX ORDER — LISINOPRIL 20 MG/1
20 TABLET ORAL DAILY
Qty: 30 TABLET | Refills: 11 | Status: SHIPPED | OUTPATIENT
Start: 2024-08-27 | End: 2025-08-27

## 2024-08-27 RX ORDER — ARIPIPRAZOLE 10 MG/1
10 TABLET ORAL DAILY
Qty: 30 TABLET | Refills: 10 | Status: SHIPPED | OUTPATIENT
Start: 2024-08-27

## 2024-08-27 ASSESSMENT — PATIENT HEALTH QUESTIONNAIRE - PHQ9
1. LITTLE INTEREST OR PLEASURE IN DOING THINGS: NOT AT ALL
SUM OF ALL RESPONSES TO PHQ9 QUESTIONS 1 AND 2: 0
2. FEELING DOWN, DEPRESSED OR HOPELESS: NOT AT ALL

## 2024-08-27 ASSESSMENT — ACTIVITIES OF DAILY LIVING (ADL)
GROCERY_SHOPPING: INDEPENDENT
BATHING: INDEPENDENT
TAKING_MEDICATION: INDEPENDENT
MANAGING_FINANCES: INDEPENDENT
DRESSING: INDEPENDENT
DOING_HOUSEWORK: INDEPENDENT

## 2024-08-27 NOTE — PROGRESS NOTES
Subjective   Patient ID: Shivani Neil is a 69 y.o. female who presents for Medicare Annual Wellness Visit Subsequent and Follow-up (Hypertension, benign; Gastroesophageal reflux disease without esophagitis; Renal insufficiency/).  I last saw the patient on 12/12/2023.     HPI     Patient is here for a F/U & AWV    Reviewed [facility] abdomen from 2022 and informed patient that she has polycystic kidney disease. Patient is feeling well and denies complaints today.    Past medical, surgical, and family history reviewed.  Reviewed and documented all medications   Pt eating well, exercising as tolerated and taking medications as directed    Has no medical concerns for rooming MA      Review of Systems  Except positives as noted in the CC & HPI      Constitutional: Denies fevers, chills, night sweats, fatigue, weight changes, change in appetite    Eyes: Denies blurry vision, double vision    ENT: Denies otalgia, trouble hearing, tinnitus, vertigo, nasal congestion, rhinorrhea, sore throat    Neck: Denies swelling, masses    Cardiovascular: Denies chest pain, palpitations, edema, orthopnea, syncope    Respiratory: Denies dyspnea, cough, wheezing, postural nocturnal dyspnea    Gastrointestinal: Denies abdominal pain, nausea, vomiting, diarrhea, constipation, melena, hematochezia    Genitourinary: Denies dysuria, hematuria, frequency, urgency    Musculoskeletal: Denies back pain, neck pain, myalgias    Integumentary: Denies skin lesions, rashes, masses    Neurological: Denies dizziness, headaches, confusion, limb weakness, paresthesias, convulsions    Psychiatric: Denies depression, anxiety, homicidal ideations, suicidal ideations, sleep disturbances    Endocrine: Denies polyphagia, polydipsia, polyuria, weakness, hair thinning, heat intolerance, cold intolerance, weight changes    Heme/Lymph: Denies easy bruising, easy bleeding    Objective   /82 (BP Location: Right arm, Patient Position: Sitting, BP Cuff Size: Adult  "long)   Pulse 67   Temp 36.2 °C (97.2 °F)   Resp 16   Ht 1.499 m (4' 11\")   Wt 63.5 kg (140 lb)   SpO2 100%   BMI 28.28 kg/m²     Physical Exam  154/82 on recheck of BP in the right arm.     Gen. Appearance - well-developed, well-nourished, 69 y.o., Black female in no acute distress.     Skin - warm, pink and dry without rash or concerning lesions.     Mental Status - alert and oriented times 3. Normal mood and affect appropriate to mood.     Neck - supple without lymphadenopathy. Carotid pulses are normal without bruits. Thyroid is normal in midline without nodules.    Chest - lungs are clear to auscultation without rales, rhonchi or wheezes.     Heart - regular, rate, and rhythm without murmurs, rubs or gallops.     Abdomen - soft, obese, protuberant, nontender, nondistended. No masses, hepatomegaly or splenomegaly is noted. No rebound, rigidity or guarding is noted. Bowel sounds are normoactive.     Extremities - no cyanosis, clubbing or edema. Pedal pulses are 2+ normal at the dorsalis pedis and posterior tibial pulses bilaterally.    Neurological - cranial nerves II through XII are grossly intact. Motor strength 5/5 at all fours.     Assessment/Plan   1. Routine general medical examination at health care facility  1 Year Follow Up In [facility] - PCP - Wellness Exam      2. Encounter for screening mammogram for malignant neoplasm of breast  BI mammo bilateral screening tomosynthesis      3. Asymptomatic menopausal state  XR DEXA bone density      4. Hypertension, benign  lisinopril 20 mg tablet    CBC and Auto Differential    Comprehensive Metabolic Panel    Basic metabolic panel      5. Polycystic kidney disease  Referral to Nephrology    Basic metabolic panel      6. Stage 3b chronic kidney disease (Multi)  Referral to Nephrology        Patient to continue current medications (with any exceptions as noted) and diet. Follow-up in 1 month(s) otherwise as needed.      Will obtain CBC and Auto " Differential, CMP, today. Will call patient with results when available.     Will obtain BMP prior to patient's next appointment. Will call patient with results when available.     Increased Lisinopril from 10 mg to 20 mg    Patient was started on:   Lisinopril 20 mg, TAKE 1 TAB BY MOUTH DAILY     Rx(s) sent to pharmacy.      Mammogram ordered for yearly breast cancer screening. Will call patient with results when available.     DEXA scan ordered to screen for osteopenia/osteoporosis. Will call patient with results when available.     Patient was advised not to take OTC NSAIDs.    Patient was referred to Dr. Serrano (Nephrology) for polycystic kidney disease.    Recommended to take FLU vaccine.     ACP was addressed and she was given a packet for living will and HCPOA.      Patient is to follow up with Dr. Delacruz (cardiology) as scheduled.    Scribe Attestation  By signing my name below, Kamilah CESAR Scribe   attest that this documentation has been prepared under the direction and in the presence of Cooper Woodson MD.

## 2024-08-27 NOTE — PROGRESS NOTES
"Subjective   Reason for Visit: Shivani Neil is an 69 y.o. female here for a Medicare Wellness visit.     Past Medical, Surgical, and Family History reviewed and updated in chart.    Reviewed all medications by prescribing practitioner or clinical pharmacist (such as prescriptions, OTCs, herbal therapies and supplements) and documented in the medical record.    HPI    Patient Care Team:  Cooper Woodson MD as PCP - General  Cooper Woodson MD as PCP - [employer]age PCP  Cedrick Delacruz DO as Cardiologist (Cardiology)  Aneta Humphrey RN as Care Manager (Case Management)     Review of Systems    Objective   Vitals:  /82 (BP Location: Right arm, Patient Position: Sitting, BP Cuff Size: Adult long)   Pulse 67   Temp 36.2 °C (97.2 °F)   Resp 16   Ht 1.499 m (4' 11\")   Wt 63.5 kg (140 lb)   SpO2 100%   BMI 28.28 kg/m²       Physical Exam    Assessment/Plan   Problem List Items Addressed This Visit             ICD-10-CM    Hypertension, benign I10    Relevant Medications    lisinopril 20 mg tablet    Other Relevant Orders    CBC and Auto Differential (Completed)    Comprehensive Metabolic Panel (Completed)    Basic metabolic panel     Other Visit Diagnoses         Codes    Routine general medical examination at health care facility    -  Primary Z00.00    Relevant Orders    1 Year Follow Up In [facility] - PCP - Wellness Exam    Encounter for screening mammogram for malignant neoplasm of breast     Z12.31    Relevant Orders    BI mammo bilateral screening tomosynthesis    Asymptomatic menopausal state     Z78.0    Relevant Orders    XR DEXA bone density    Polycystic kidney disease     Q61.3    Relevant Orders    Referral to Nephrology    Basic metabolic panel    Stage 3b chronic kidney disease (Multi)     N18.32    Relevant Orders    Referral to Nephrology                 "

## 2024-08-27 NOTE — LETTER
September 13, 2024     Shivani Neil  [address]  [place] [address]      Dear Ms. Neil:    Below are the results from your recent visit:  Chemistry panel reveals an improved GFR of 43.  Sodium is low at 127.  It could be caused by one of her medications possibly haloperidol. It will be rechecked  her to her next visit.  CBC is normal/improved.    Resulted Orders   CBC and Auto Differential   Result Value Ref Range    WBC 6.1 4.4 - 11.3 x10*3/uL    nRBC 0.0 0.0 - 0.0 /100 WBCs    RBC 4.10 4.00 - 5.20 x10*6/uL    Hemoglobin 11.9 (L) 12.0 - 16.0 g/dL    Hematocrit 37.4 36.0 - 46.0 %    MCV 91 80 - 100 fL    MCH 29.0 26.0 - 34.0 pg    MCHC 31.8 (L) 32.0 - 36.0 g/dL    RDW 13.4 11.5 - 14.5 %    Platelets 306 150 - 450 x10*3/uL    Neutrophils % 47.5 40.0 - 80.0 %    Immature Granulocytes %, Automated 0.3 0.0 - 0.9 %      Comment:      Immature Granulocyte Count (IG) includes promyelocytes, myelocytes and metamyelocytes but does not include bands. Percent differential counts (%) should be interpreted in the context of the absolute cell counts (cells/UL).    Lymphocytes % 41.3 13.0 - 44.0 %    Monocytes % 8.2 2.0 - 10.0 %    Eosinophils % 2.0 0.0 - 6.0 %    Basophils % 0.7 0.0 - 2.0 %    Neutrophils Absolute 2.89 1.20 - 7.70 x10*3/uL      Comment:      Percent differential counts (%) should be interpreted in the context of the absolute cell counts (cells/uL).    Immature Granulocytes Absolute, Automated 0.02 0.00 - 0.70 x10*3/uL    Lymphocytes Absolute 2.51 1.20 - 4.80 x10*3/uL    Monocytes Absolute 0.50 0.10 - 1.00 x10*3/uL    Eosinophils Absolute 0.12 0.00 - 0.70 x10*3/uL    Basophils Absolute 0.04 0.00 - 0.10 x10*3/uL   Comprehensive Metabolic Panel   Result Value Ref Range    Glucose 79 74 - 99 mg/dL    Sodium 127 (L) 136 - 145 mmol/L    Potassium 4.7 3.5 - 5.3 mmol/L    Chloride 98 98 - 107 mmol/L    Bicarbonate 23 21 - 32 mmol/L    Anion Gap 11 mmol/L    Urea Nitrogen 15 6 - 23 mg/dL     Creatinine 1.35 (H) 0.50 - 1.05 mg/dL    eGFR 43 (L) >60 mL/min/1.73m*2      Comment:      Calculations of estimated GFR are performed using the 2021 CKD-EPI Study Refit equation without the race variable for the IDMS-Traceable creatinine methods.  https://jasn.asnjournals.org/content/early/2021/09/22/ASN.2494554888    Calcium 9.3 8.6 - 10.3 mg/dL    Albumin 4.2 3.4 - 5.0 g/dL    Alkaline Phosphatase 74 33 - 136 U/L    Total Protein 7.9 6.4 - 8.2 g/dL    AST 20 9 - 39 U/L    Bilirubin, Total 0.4 0.0 - 1.2 mg/dL    ALT 16 7 - 45 U/L      Comment:      Patients treated with Sulfasalazine may generate falsely decreased results for ALT.         If you have any questions or concerns, please don't hesitate to call.         Sincerely,        [facility] TECH

## 2024-08-28 ENCOUNTER — PATIENT OUTREACH (OUTPATIENT)
Dept: PRIMARY CARE | Facility: CLINIC | Age: 69
End: 2024-08-28
Payer: MEDICARE

## 2024-08-28 DIAGNOSIS — F41.8 DEPRESSION WITH ANXIETY: ICD-10-CM

## 2024-08-28 RX ORDER — ARIPIPRAZOLE 10 MG/1
10 TABLET ORAL DAILY
Qty: 30 TABLET | Refills: 10 | OUTPATIENT
Start: 2024-08-28

## 2024-08-28 NOTE — RESULT ENCOUNTER NOTE
Please call the patient regarding her abnormal result.  Chemistry panel reveals an improved GFR of 43.  Sodium is low at 127.  It could be caused by one of her medications possibly haloperidol. It will be rechecked  her to her next visit.  CBC is normal/improved.

## 2024-08-28 NOTE — PROGRESS NOTES
Unable to reach patient for call back after patient's follow up appointment with PCP.   TONNYM with call back number for patient to call if needed   If no voicemail available call attempts x 2 were made to contact the patient to assist with any questions or concerns patient may have.

## 2024-08-30 ENCOUNTER — TELEPHONE (OUTPATIENT)
Dept: CARDIOLOGY | Facility: CLINIC | Age: 69
End: 2024-08-30
Payer: MEDICARE

## 2024-09-05 ENCOUNTER — APPOINTMENT (OUTPATIENT)
Dept: CARDIOLOGY | Facility: CLINIC | Age: 69
End: 2024-09-05
Payer: MEDICARE

## 2024-09-05 VITALS
HEIGHT: 59 IN | DIASTOLIC BLOOD PRESSURE: 90 MMHG | BODY MASS INDEX: 28.2 KG/M2 | HEART RATE: 72 BPM | SYSTOLIC BLOOD PRESSURE: 148 MMHG | WEIGHT: 139.9 LBS

## 2024-09-05 DIAGNOSIS — E66.3 OVERWEIGHT WITH BODY MASS INDEX (BMI) OF 29 TO 29.9 IN ADULT: ICD-10-CM

## 2024-09-05 DIAGNOSIS — I10 HYPERTENSION, BENIGN: ICD-10-CM

## 2024-09-05 PROCEDURE — 1159F MED LIST DOCD IN RCRD: CPT | Performed by: INTERNAL MEDICINE

## 2024-09-05 PROCEDURE — 3080F DIAST BP >= 90 MM HG: CPT | Performed by: INTERNAL MEDICINE

## 2024-09-05 PROCEDURE — 99214 OFFICE O/P EST MOD 30 MIN: CPT | Performed by: INTERNAL MEDICINE

## 2024-09-05 PROCEDURE — 3077F SYST BP >= 140 MM HG: CPT | Performed by: INTERNAL MEDICINE

## 2024-09-05 PROCEDURE — 3008F BODY MASS INDEX DOCD: CPT | Performed by: INTERNAL MEDICINE

## 2024-09-05 PROCEDURE — 1123F ACP DISCUSS/DSCN MKR DOCD: CPT | Performed by: INTERNAL MEDICINE

## 2024-09-05 PROCEDURE — 1111F DSCHRG MED/CURRENT MED MERGE: CPT | Performed by: INTERNAL MEDICINE

## 2024-09-05 PROCEDURE — 1036F TOBACCO NON-USER: CPT | Performed by: INTERNAL MEDICINE

## 2024-09-05 RX ORDER — LISINOPRIL 20 MG/1
20 TABLET ORAL 2 TIMES DAILY
Qty: 180 TABLET | Refills: 3 | Status: SHIPPED | OUTPATIENT
Start: 2024-09-05 | End: 2025-09-05

## 2024-09-05 NOTE — PROGRESS NOTES
Patient:  Shivani Neil  YOB: 1955  MRN: [id number]       Chief Complaint/Active Symptoms:       Shivani Neil is a 69 y.o. female who returns today for cardiac follow-up.  Patient had been at the hospital at which time we are asked to see in consult.  Her echocardiogram showed no major abnormalities at the time.  She was sent out with plans to do a outpatient monitor which has been completed showing no significant arrhythmias.  She says she is feeling better.  Her blood pressure is still a bit high.  We can increase her medicine but she will follow blood pressure routinely with her primary care physician Dr. Woodson.  Has had no additional episodes of lightheadedness or syncope which was her initial presentation according to the chart.      Objective:     Vitals:    09/05/24 1335   BP: 148/90   Pulse: 72       Vitals:    09/05/24 1335   Weight: 63.5 kg (139 lb 14.4 oz)       Allergies:     No Known Allergies       Medications:     Current Outpatient Medications   Medication Instructions    ARIPiprazole (ABILIFY) 10 mg, oral, Daily    Austedo 12 mg tablet 1 tablet, oral, Every 12 hours scheduled (0630,1830)    buPROPion XL (WELLBUTRIN XL) 300 mg, oral, Daily before breakfast    DULoxetine (CYMBALTA) 60 mg, oral, Daily    haloperidol (HALDOL) 10 mg, oral, 2 times daily    lisinopril 20 mg, oral, Daily    omeprazole (PRILOSEC) 40 mg, oral, Daily       Physical Examination:   Constitutional:       Appearance: Healthy appearance. Not in distress.   Neck:      Vascular: No JVR. JVD normal.   Pulmonary:      Effort: Pulmonary effort is normal.      Breath sounds: Normal breath sounds. No wheezing. No rhonchi. No rales.   Chest:      Chest wall: Not tender to palpatation.   Cardiovascular:      PMI at left midclavicular line. Normal rate. Regular rhythm. Normal S1. Normal S2.       Murmurs: There is no murmur.      No gallop.  No click. No rub.   Pulses:     Intact distal pulses.   Edema:     Peripheral  "edema absent.   Abdominal:      General: Bowel sounds are normal.      Palpations: Abdomen is soft.      Tenderness: There is no abdominal tenderness.   Musculoskeletal: Normal range of motion.         General: No tenderness. Skin:     General: Skin is warm and dry.   Neurological:      General: No focal deficit present.      Mental Status: Alert and oriented to person, place and time.            Lab:     CBC:   Lab Results   Component Value Date    WBC 6.1 08/27/2024    RBC 4.10 08/27/2024    HGB 11.9 (L) 08/27/2024    HCT 37.4 08/27/2024     08/27/2024        CMP:    Lab Results   Component Value Date     (L) 08/27/2024    K 4.7 08/27/2024    CL 98 08/27/2024    CO2 23 08/27/2024    BUN 15 08/27/2024    CREATININE 1.35 (H) 08/27/2024    GLUCOSE 79 08/27/2024    CALCIUM 9.3 08/27/2024       Magnesium:    Lab Results   Component Value Date    MG 1.63 08/09/2024       Lipid Profile:    Lab Results   Component Value Date    TRIG 130 12/28/2023    HDL 65.0 12/28/2023    LDLCALC 107 (H) 12/28/2023       TSH:    Lab Results   Component Value Date    TSH 2.00 12/28/2023       BNP:   No results found for: \"BNP\"     PT/INR:    No results found for: \"PROTIME\", \"INR\"    HgBA1c:    No results found for: \"HGBA1C\"    BMP:  Lab Results   Component Value Date     (L) 08/27/2024     08/10/2024     08/09/2024    K 4.7 08/27/2024    K 4.2 08/10/2024    K 3.9 08/09/2024    CL 98 08/27/2024     08/10/2024     08/09/2024    CO2 23 08/27/2024    CO2 29 08/10/2024    CO2 27 08/09/2024    BUN 15 08/27/2024    BUN 30 (H) 08/10/2024    BUN 30 (H) 08/09/2024    CREATININE 1.35 (H) 08/27/2024    CREATININE 1.55 (H) 08/10/2024    CREATININE 1.92 (H) 08/09/2024       CBC:  Lab Results   Component Value Date    WBC 6.1 08/27/2024    WBC 4.9 08/10/2024    WBC 3.9 (L) 08/09/2024    RBC 4.10 08/27/2024    RBC 3.73 (L) 08/10/2024    RBC 4.05 08/09/2024    HGB 11.9 (L) 08/27/2024    HGB 10.9 (L) 08/10/2024    " HGB 11.8 (L) 08/09/2024    HCT 37.4 08/27/2024    HCT 33.2 (L) 08/10/2024    HCT 35.7 (L) 08/09/2024    MCV 91 08/27/2024    MCV 89 08/10/2024    MCV 88 08/09/2024    MCH 29.0 08/27/2024    MCH 29.2 08/10/2024    MCH 29.1 08/09/2024    MCHC 31.8 (L) 08/27/2024    MCHC 32.8 08/10/2024    MCHC 33.1 08/09/2024    RDW 13.4 08/27/2024    RDW 13.0 08/10/2024    RDW 12.8 08/09/2024     08/27/2024     08/10/2024     08/09/2024       Cardiac Enzymes:    Lab Results   Component Value Date    TROPHS 10 08/08/2024    TROPHS 11 08/07/2024       Hepatic Function Panel:    Lab Results   Component Value Date    ALKPHOS 74 08/27/2024    ALT 16 08/27/2024    AST 20 08/27/2024    PROT 7.9 08/27/2024    BILITOT 0.4 08/27/2024         Diagnostic Studies:     Transthoracic Echo (TTE) Complete    Result Date: 8/8/2024          [place] [id number]  Tel [phone] Fax [phone] TRANSTHORACIC ECHOCARDIOGRAM REPORT Patient Name:      ALEX Becker Physician:    [id number] Cedrick Delacruz DO Study Date:        8/8/2024             Ordering Provider:    [id number] SANCHEZ MRN/PID:           [id number]             Fellow: Accession#:        [id number]         Nurse:                Christen Fisher Date of Birth/Age: 1955 / 68 years Sonographer:          Muriel Wang RDCS Gender:            F                    Additional Staff: Height:            149.86 cm            Admit Date:           8/7/2024 Weight:            60.33 kg             Admission Status:     Inpatient -                                                               Routine BSA / BMI:         1.55 m2 / 26.86      Department Location:  [place] [address]                                      Echo Lab  Blood Pressure: 130 /81 mmHg Study Type:    TRANSTHORACIC ECHO (TTE) COMPLETE Diagnosis/ICD: Syncope and collapse-R55 Indication:    Syncope CPT Codes:     Echo Complete w Full [id number] Patient History: Pertinent History: HTN and Dizziness. Study Detail: The following Echo studies were performed: 2D, M-Mode, Doppler and               color flow. Agitated saline used as a contrast agent for               intraseptal flow evaluation. The patient was awake.  PHYSICIAN INTERPRETATION: Left Ventricle: Left ventricular ejection fraction is normal, by visual estimate at 60-65%. There are no regional wall motion abnormalities. The left ventricular cavity size is normal. Spectral Doppler shows an impaired relaxation pattern of left ventricular diastolic filling. LV Wall Scoring: All segments are normal. Left Atrium: The left atrium is normal in size. A bubble study using agitated saline was performed. Bubble study is negative. Right Ventricle: The right ventricle is normal in size. There is normal right ventricular global systolic function. Right Atrium: The right atrium is normal in size. Aortic Valve: The aortic valve appears structurally normal. The aortic valve appears tricuspid. There is no evidence of aortic valve stenosis. The aortic valve dimensionless index is 0.73. There is no evidence of aortic valve regurgitation. The peak instantaneous gradient of the aortic valve is 6.8 mmHg. The mean gradient of the aortic valve is 4.0 mmHg. Mitral Valve: The mitral valve is normal in structure. There is no evidence of mitral valve stenosis. There is normal mitral valve leaflet mobility. There is no evidence of mitral valve regurgitation. Tricuspid Valve: The tricuspid valve is structurally normal. There is normal tricuspid valve leaflet mobility. There is trace tricuspid regurgitation. Pulmonic Valve: The pulmonic valve is structurally normal. There is no indication of pulmonic valve regurgitation. Pericardium: There is  a trivial pericardial effusion. Aorta: The aortic root is normal. Pulmonary Artery: The main pulmonary artery is normal in size, and position, with normal bifurcation into the left and right pulmonary arteries. Systemic Veins: The inferior vena cava appears to be of normal size. In comparison to the previous echocardiogram(s): Compared with study dated 12/28/2023, no significant change.  CONCLUSIONS:  1. Left ventricular ejection fraction is normal, by visual estimate at 60-65%.  2. No regional wall motion abnormalities.  3. Spectral Doppler shows an impaired relaxation pattern of left ventricular diastolic filling.  4. There is normal right ventricular global systolic function.  5. There is no evidence of mitral valve stenosis.  6. No evidence of mitral valve regurgitation.  7. Trace tricuspid regurgitation is visualized.  8. Aortic valve stenosis is not present.  9. The main pulmonary artery is normal in size, and position, with normal bifurcation into the left and right pulmonary arteries. RECOMMENDATIONS: Transthoracic echo has low negative predictive value for mass, vegetation, or embolic source. Consider MASSIEL or MRI if clinically indicated.  QUANTITATIVE DATA SUMMARY: 2D MEASUREMENTS:                          Normal Ranges: Ao Root d:     2.73 cm   (2.0-3.7cm) LAs:           3.23 cm   (2.7-4.0cm) IVSd:          0.95 cm   (0.6-1.1cm) LVPWd:         0.88 cm   (0.6-1.1cm) LVIDd:         3.31 cm   (3.9-5.9cm) LVIDs:         2.17 cm LV Mass Index: 54.0 g/m2 LV % FS        34.4 % LA VOLUME:                               Normal Ranges: LA Vol A4C:        18.5 ml    (22+/-6mL/m2) LA Vol A2C:        16.8 ml LA Vol BP:         18.6 ml LA Vol Index A4C:  11.9ml/m2 LA Vol Index A2C:  10.9 ml/m2 LA Vol Index BP:   12.0 ml/m2 LA Area A4C:       9.3 cm2 LA Area A2C:       9.3 cm2 LA Major Axis A4C: 4.0 cm LA Major Axis A2C: 4.4 cm LA Volume Index:   11.2 ml/m2 RA VOLUME BY A/L METHOD:                               Normal  Ranges: RA Vol A4C:        19.3 ml    (8.3-19.5ml) RA Vol Index A4C:  12.5 ml/m2 RA Area A4C:       9.7 cm2 RA Major Axis A4C: 4.1 cm AORTA MEASUREMENTS:                    Normal Ranges: Asc Ao, d: 2.92 cm (2.1-3.4cm) LV SYSTOLIC FUNCTION BY 2D PLANIMETRY (MOD):                      Normal Ranges: EF-A4C View:    66 % (>=55%) EF-A2C View:    68 % EF-Biplane:     66 % EF-Visual:      63 % LV EF Reported: 63 % LV DIASTOLIC FUNCTION:                         Normal Ranges: MV Peak E:    0.70 m/s  (0.7-1.2 m/s) MV Peak A:    1.08 m/s  (0.42-0.7 m/s) E/A Ratio:    0.65      (1.0-2.2) MV e'         0.092 m/s (>8.0) MV lateral e' 0.11 m/s MV medial e'  0.07 m/s E/e' Ratio:   7.59      (<8.0) MITRAL VALVE:                 Normal Ranges: MV DT: 263 msec (150-240msec) AORTIC VALVE:                                   Normal Ranges: AoV Vmax:                1.30 m/s (<=1.7m/s) AoV Peak P.8 mmHg (<20mmHg) AoV Mean P.0 mmHg (1.7-11.5mmHg) LVOT Max Abel:            0.98 m/s (<=1.1m/s) AoV VTI:                 25.70 cm (18-25cm) LVOT VTI:                18.80 cm LVOT Diameter:           1.72 cm  (1.8-2.4cm) AoV Area, VTI:           1.70 cm2 (2.5-5.5cm2) AoV Area,Vmax:           1.76 cm2 (2.5-4.5cm2) AoV Dimensionless Index: 0.73  RIGHT VENTRICLE: RV Basal 2.65 cm RV Mid   2.06 cm RV Major 6.1 cm TAPSE:   15.0 mm RV s'    0.14 m/s TRICUSPID VALVE/RVSP:                             Normal Ranges: Peak TR Velocity: 1.74 m/s RV Syst Pressure: 15.1 mmHg (< 30mmHg) IVC Diam:         0.92 cm PULMONIC VALVE:                         Normal Ranges: PV Accel Time: 66 msec  (>120ms) PV Max Abel:    0.8 m/s  (0.6-0.9m/s) PV Max P.7 mmHg  [id number] Cedrick Delacruz DO Electronically signed on 2024 at 10:58:59 AM  Wall Scoring  ** Final **     ECG 12 lead    Result Date: 2024  Normal sinus rhythm Normal ECG When compared with ECG of 07-AUG-2024 23:17, (unconfirmed) Premature ventricular complexes are no  longer Present Premature atrial complexes are no longer Present Confirmed by Cedrick Delacruz ([id number]) on 8/8/2024 10:02:17 AM    CT chest abdomen pelvis wo IV contrast    Result Date: 8/8/2024  Interpreted By:  Jaswant Jimenes, STUDY: CT CHEST ABDOMEN PELVIS WO CONTRAST;  8/8/2024 12:35 am   INDICATION: Signs/Symptoms:syncope, flank pain.   COMPARISON: None.   ACCESSION NUMBER(S): [id number]   ORDERING CLINICIAN: MARIBEL HERNANDEZ   TECHNIQUE: Contiguous axial images of the chest, abdomen, and pelvis were obtained after the intravenous administration of iodinated contrast. Coronal and sagittal reformatted images were reconstructed from the axial data. CT chest was obtained using PE angiographic protocol with MIP images created on a separate independent workstation.   FINDINGS: CT CHEST:   There is some streak artifact from the arms which were not raised.   Heart size within normal limits.   No effusion.   No significant adenopathy suspected.   Lungs are clear without consolidation.   CT ABDOMEN/PELVIS:   Scattered cysts are seen involving the liver and kidneys.   Noncontrast appearance of the liver, gallbladder, adrenals, pancreas and spleen are otherwise unremarkable.   No hydronephrosis or hydroureter.   No bowel obstruction. No appendicitis or colitis.   Calcified fibroid uterus seen.   No free fluid or significant adenopathy.   Mild calcification aorta.   No free fluid or significant adenopathy.   Mild-to-moderate spondylotic degeneration seen worst at L5-S1.       No significant acute process seen chest abdomen pelvis on noncontrast imaging.   Incidental findings as above   MACRO: None.   Signed by: Jaswant Jimenes 8/8/2024 1:39 AM Dictation workstation:   [id number]    CT head wo IV contrast    Result Date: 8/8/2024  Interpreted By:  Jaswant Jimenes, STUDY: CT HEAD WO IV CONTRAST; CT CERVICAL SPINE WO IV CONTRAST;  8/8/2024 12:35 am   INDICATION: Signs/Symptoms:syncope/fall.   COMPARISON: None.   ACCESSION NUMBER(S):  [id number]; [id number]   ORDERING CLINICIAN: MARIBEL HERNANDEZ   TECHNIQUE: Noncontrast CT images of head. Axial noncontrast CT images of the cervical spine with coronal and sagittal reconstructed images.   FINDINGS: BRAIN PARENCHYMA:  Gray-white matter interfaces are  preserved. No mass effect or midline shift.   HEMORRHAGE: No acute intracranial hemorrhage. VENTRICLES and EXTRA-AXIAL SPACES: The ventricles and sulci are within normal limits in size for brain volume. No abnormal extraaxial fluid collection. EXTRACRANIAL SOFT TISSUES: Within normal limits. PARANASAL SINUSES/MASTOIDS:  The visualized paranasal sinuses and mastoid air cells are aerated. CALVARIUM: No depressed skull fracture. No destructive osseous lesion.   OTHER FINDINGS: None.   CERVICAL SPINE:   ALIGNMENT: Normal. VERTEBRAE: No acute fracture. SPINAL CANAL: Multilevel moderate degenerative disc disease seen. No critical spinal canal stenosis. PREVERTEBRAL SOFT TISSUES: No prevertebral soft tissue swelling. LUNG APICES: Imaged portion of the lung apices are within normal limits.   OTHER FINDINGS: None.       No acute intracranial abnormality.   No acute fracture or traumatic subluxation of the cervical spine.   Moderate cervical spondylotic degenerative change.   MACRO: None   Signed by: Jaswant Jimenes 8/8/2024 1:21 AM Dictation workstation:   [id number]    CT cervical spine wo IV contrast    Result Date: 8/8/2024  Interpreted By:  Jaswant Jimenes, STUDY: CT HEAD WO IV CONTRAST; CT CERVICAL SPINE WO IV CONTRAST;  8/8/2024 12:35 am   INDICATION: Signs/Symptoms:syncope/fall.   COMPARISON: None.   ACCESSION NUMBER(S): [id number]; [id number]   ORDERING CLINICIAN: MARIBEL HERNANDEZ   TECHNIQUE: Noncontrast CT images of head. Axial noncontrast CT images of the cervical spine with coronal and sagittal reconstructed images.   FINDINGS: BRAIN PARENCHYMA:  Gray-white matter interfaces are  preserved. No mass effect or midline shift.   HEMORRHAGE: No acute  "intracranial hemorrhage. VENTRICLES and EXTRA-AXIAL SPACES: The ventricles and sulci are within normal limits in size for brain volume. No abnormal extraaxial fluid collection. EXTRACRANIAL SOFT TISSUES: Within normal limits. PARANASAL SINUSES/MASTOIDS:  The visualized paranasal sinuses and mastoid air cells are aerated. CALVARIUM: No depressed skull fracture. No destructive osseous lesion.   OTHER FINDINGS: None.   CERVICAL SPINE:   ALIGNMENT: Normal. VERTEBRAE: No acute fracture. SPINAL CANAL: Multilevel moderate degenerative disc disease seen. No critical spinal canal stenosis. PREVERTEBRAL SOFT TISSUES: No prevertebral soft tissue swelling. LUNG APICES: Imaged portion of the lung apices are within normal limits.   OTHER FINDINGS: None.       No acute intracranial abnormality.   No acute fracture or traumatic subluxation of the cervical spine.   Moderate cervical spondylotic degenerative change.   MACRO: None   Signed by: Jaswant Jimenes 8/8/2024 1:21 AM Dictation workstation:   [id number]      EKG:   No results found for: \"EKG\"      Date received for review 08/27/24\        48 hour holter  Indication for testing -syncope     No symptoms     Underlying normal sinus rhythm:  Average heart rate 80 bpm  Maximal heart rate 132 bpm, sinus tachycardia 2:29 PM  Minimal heart rate 63 bpm, normal sinus rhythm at 6:57 AM     PVC's were rare  0.2% PVC's     PAC's were rare  0.0% PAC's     Nonspecific ST abnormality  Normal heart rate variability     Impression  No clinically significant arrhythmia.     Clinical correlation recommended.  Radiology:     No orders to display       Assessment/Plan:         Patient Active Problem List   Diagnosis    Alopecia of scalp    Depression with anxiety    GERD (gastroesophageal reflux disease)    History of alcohol abuse    History of hepatitis C virus infection    Hypertension, benign    Mild anemia    Osteopenia of both hips    Schizoaffective disorder, bipolar type (Multi)    " Overweight with body mass index (BMI) of 29 to 29.9 in adult         ASSESSMENT       69-year-old female here for follow-up and test results.    Meds, vitals, examination as noted.    Chart reviewed in details cussed the patient at length.    Impression:  Hypertension  Near syncope  Schizo affective disorder bipolar type  Overweight  GERD  Depression anxiety  Occasional PAC and PVC  No significant evidence of major cardiovascular issue or concern at this time  PLAN       Recommendation:  Increase lisinopril to 20 twice daily  Continue other meds as before  Follow-up with primary care for general blood pressure management  See me in 3 to 4 months  Call should any issues or problems otherwise develop

## 2024-09-05 NOTE — PATIENT INSTRUCTIONS
Continue same medications/treatment.  Patient educated on proper medication use.  Patient educated on risk factor modification.  Please bring any lab results from other providers/physicians to your next appointment.    Please bring all medicines, vitamins, and herbal supplements with you when you come to the office.    Prescriptions will not be filled unless you are compliant with your follow up appointments or have a follow up appointment scheduled as per instruction of your physician. Refills should be requested at the time of your visit.    Follow up in December   INCREASE Lisinopril to 2x daily    I, RONAK ODONNELL RN, AM SCRIBING FOR AND IN THE PRESENCE OF DR. ESSIE JEROME, DO, FACC

## 2024-09-27 DIAGNOSIS — F41.8 DEPRESSION WITH ANXIETY: ICD-10-CM

## 2024-09-27 NOTE — TELEPHONE ENCOUNTER
Rx Refill Request     Name: Shivani Neil  :  1955   Medication Name:  DULoxetine (Cymbalta) 60 mg DR capsule   Specific Pharmacy location:  [facility]/pharmacy #[id number] [place] [address]   Date of last appointment:  2024   Date of next appointment:  10/1/2024   Best number to reach patient:  [phone]

## 2024-09-28 RX ORDER — DULOXETIN HYDROCHLORIDE 60 MG/1
60 CAPSULE, DELAYED RELEASE ORAL DAILY
Qty: 90 CAPSULE | Refills: 3 | Status: SHIPPED | OUTPATIENT
Start: 2024-09-28

## 2024-10-01 ENCOUNTER — APPOINTMENT (OUTPATIENT)
Dept: PRIMARY CARE | Facility: CLINIC | Age: 69
End: 2024-10-01
Payer: MEDICARE

## 2024-10-14 ENCOUNTER — APPOINTMENT (OUTPATIENT)
Dept: RADIOLOGY | Facility: HOSPITAL | Age: 69
End: 2024-10-14
Payer: MEDICARE

## 2024-10-28 ENCOUNTER — APPOINTMENT (OUTPATIENT)
Dept: RADIOLOGY | Facility: HOSPITAL | Age: 69
End: 2024-10-28
Payer: MEDICARE

## 2024-12-05 ENCOUNTER — APPOINTMENT (OUTPATIENT)
Dept: CARDIOLOGY | Facility: CLINIC | Age: 69
End: 2024-12-05
Payer: MEDICARE

## 2025-01-06 ENCOUNTER — TELEPHONE (OUTPATIENT)
Dept: PRIMARY CARE | Facility: CLINIC | Age: 70
End: 2025-01-06

## 2025-01-06 NOTE — TELEPHONE ENCOUNTER
Patient called in requesting all of medical records be faxed to [facility].   Fax #: [phone]    Patient made aware she will be called if anything else is needed.

## 2025-01-14 DIAGNOSIS — K21.9 GASTROESOPHAGEAL REFLUX DISEASE, UNSPECIFIED WHETHER ESOPHAGITIS PRESENT: ICD-10-CM

## 2025-01-15 RX ORDER — OMEPRAZOLE 40 MG/1
40 CAPSULE, DELAYED RELEASE ORAL DAILY
Qty: 30 CAPSULE | Refills: 0 | Status: SHIPPED | OUTPATIENT
Start: 2025-01-15

## 2025-01-15 NOTE — TELEPHONE ENCOUNTER
Rx Refill Request     Name: Shivani Neil  :  1955   Medication Name:  omeprazole (PriLOSEC) 40 mg DR capsule   Specific Pharmacy location:  [facility]   Date of last appointment:  2024   Date of next appointment:  Visit date not found   Best number to reach patient:  [phone]

## 2025-02-11 DIAGNOSIS — K21.9 GASTROESOPHAGEAL REFLUX DISEASE, UNSPECIFIED WHETHER ESOPHAGITIS PRESENT: ICD-10-CM

## 2025-02-12 RX ORDER — OMEPRAZOLE 40 MG/1
40 CAPSULE, DELAYED RELEASE ORAL DAILY
Qty: 30 CAPSULE | Refills: 10 | Status: SHIPPED | OUTPATIENT
Start: 2025-02-12